# Patient Record
Sex: MALE | Race: WHITE | NOT HISPANIC OR LATINO | Employment: UNEMPLOYED | URBAN - METROPOLITAN AREA
[De-identification: names, ages, dates, MRNs, and addresses within clinical notes are randomized per-mention and may not be internally consistent; named-entity substitution may affect disease eponyms.]

---

## 2017-04-03 ENCOUNTER — HOSPITAL ENCOUNTER (OUTPATIENT)
Dept: OTHER | Facility: HOSPITAL | Age: 37
Discharge: HOME | End: 2017-04-03
Attending: FAMILY MEDICINE | Admitting: FAMILY MEDICINE

## 2017-05-26 ENCOUNTER — HOSPITAL ENCOUNTER (OUTPATIENT)
Dept: PHYSICAL THERAPY | Facility: HOSPITAL | Age: 37
Setting detail: RECURRING SERIES
Discharge: HOME | End: 2017-08-16
Attending: ORTHOPAEDIC SURGERY | Admitting: ORTHOPAEDIC SURGERY

## 2017-06-14 ENCOUNTER — HOSPITAL ENCOUNTER (OUTPATIENT)
Dept: PAIN MEDICINE | Facility: HOSPITAL | Age: 37
Discharge: HOME | End: 2017-06-14
Attending: PHYSICAL MEDICINE & REHABILITATION | Admitting: PHYSICAL MEDICINE & REHABILITATION

## 2017-07-05 ENCOUNTER — HOSPITAL ENCOUNTER (OUTPATIENT)
Dept: PAIN MEDICINE | Facility: HOSPITAL | Age: 37
Discharge: HOME | End: 2017-07-05
Attending: PHYSICAL MEDICINE & REHABILITATION | Admitting: PHYSICAL MEDICINE & REHABILITATION

## 2017-07-12 ENCOUNTER — HOSPITAL ENCOUNTER (OUTPATIENT)
Dept: ORTHOPEDIC SURGERY | Facility: CLINIC | Age: 37
Discharge: HOME | End: 2017-07-12
Attending: ORTHOPAEDIC SURGERY | Admitting: ORTHOPAEDIC SURGERY

## 2017-11-08 ENCOUNTER — HOSPITAL ENCOUNTER (OUTPATIENT)
Dept: LAB | Facility: HOSPITAL | Age: 37
Discharge: HOME | End: 2017-11-08
Attending: INTERNAL MEDICINE | Admitting: INTERNAL MEDICINE

## 2017-11-08 ENCOUNTER — OFFICE (AMBULATORY)
Dept: URBAN - METROPOLITAN AREA CLINIC 64 | Facility: CLINIC | Age: 37
End: 2017-11-08

## 2017-11-08 VITALS
DIASTOLIC BLOOD PRESSURE: 80 MMHG | HEIGHT: 71 IN | HEART RATE: 62 BPM | SYSTOLIC BLOOD PRESSURE: 125 MMHG | WEIGHT: 240 LBS

## 2017-11-08 DIAGNOSIS — R10.13 EPIGASTRIC PAIN: ICD-10-CM

## 2017-11-08 DIAGNOSIS — K21.9 GASTRO-ESOPHAGEAL REFLUX DISEASE WITHOUT ESOPHAGITIS: ICD-10-CM

## 2017-11-08 DIAGNOSIS — K59.1 FUNCTIONAL DIARRHEA: ICD-10-CM

## 2017-11-08 DIAGNOSIS — K62.5 HEMORRHAGE OF ANUS AND RECTUM: ICD-10-CM

## 2017-11-08 PROCEDURE — 99243 OFF/OP CNSLTJ NEW/EST LOW 30: CPT | Performed by: INTERNAL MEDICINE

## 2017-11-08 RX ORDER — MESALAMINE 1.2 G/1
2.4 TABLET, DELAYED RELEASE ORAL
Qty: 60 | Refills: 11 | Status: COMPLETED
Start: 2017-11-08 | End: 2018-05-03

## 2017-11-08 RX ORDER — PANTOPRAZOLE SODIUM 40 MG/1
40 TABLET, DELAYED RELEASE ORAL
Qty: 30 | Refills: 11 | Status: COMPLETED
Start: 2017-11-08 | End: 2018-12-27

## 2017-11-15 LAB
IBD PROGNOSTIC PNL SERPL IA: NORMAL
IBD SGI DIAGNOSTIC: NORMAL

## 2017-12-18 ENCOUNTER — HOSPITAL ENCOUNTER (OUTPATIENT)
Dept: MRI IMAGING | Facility: HOSPITAL | Age: 37
Discharge: HOME | End: 2017-12-18
Attending: ORTHOPAEDIC SURGERY | Admitting: ORTHOPAEDIC SURGERY

## 2018-05-03 ENCOUNTER — OFFICE (AMBULATORY)
Dept: URBAN - METROPOLITAN AREA CLINIC 64 | Facility: CLINIC | Age: 38
End: 2018-05-03

## 2018-05-03 VITALS
DIASTOLIC BLOOD PRESSURE: 82 MMHG | WEIGHT: 240 LBS | HEIGHT: 71 IN | SYSTOLIC BLOOD PRESSURE: 127 MMHG | HEART RATE: 66 BPM

## 2018-05-03 DIAGNOSIS — R35.8 OTHER POLYURIA: ICD-10-CM

## 2018-05-03 DIAGNOSIS — L97.523: ICD-10-CM

## 2018-05-03 DIAGNOSIS — R63.5 ABNORMAL WEIGHT GAIN: ICD-10-CM

## 2018-05-03 DIAGNOSIS — K51.90 ULCERATIVE COLITIS, UNSPECIFIED, WITHOUT COMPLICATIONS: ICD-10-CM

## 2018-05-03 PROCEDURE — 99213 OFFICE O/P EST LOW 20 MIN: CPT | Performed by: NURSE PRACTITIONER

## 2018-08-15 ENCOUNTER — HOSPITAL ENCOUNTER (OUTPATIENT)
Dept: ORTHOPEDIC SURGERY | Facility: CLINIC | Age: 38
Discharge: HOME | End: 2018-08-15
Attending: ORTHOPAEDIC SURGERY | Admitting: ORTHOPAEDIC SURGERY

## 2018-08-20 ENCOUNTER — HOSPITAL ENCOUNTER (OUTPATIENT)
Dept: MRI IMAGING | Facility: HOSPITAL | Age: 38
Discharge: HOME | End: 2018-08-20
Attending: ORTHOPAEDIC SURGERY | Admitting: ORTHOPAEDIC SURGERY

## 2018-08-30 ENCOUNTER — HOSPITAL ENCOUNTER (OUTPATIENT)
Dept: PREADMISSION TESTING | Facility: HOSPITAL | Age: 38
Discharge: HOME | End: 2018-08-30
Attending: ORTHOPAEDIC SURGERY | Admitting: ORTHOPAEDIC SURGERY

## 2018-08-30 LAB
ABO + RH BLD: NORMAL
ANION GAP SERPL CALC-SCNC: 14.5 MMOL/L (ref 10–20)
ARMBAND: NORMAL
BACTERIA SPEC AEROBE CULT: NORMAL
BASOPHILS # BLD AUTO: 0 10*3/UL (ref 0–0.2)
BASOPHILS NFR BLD AUTO: 1 % (ref 0–2)
BILIRUB UR QL STRIP: NEGATIVE MG/DL
BLD COMPONENT TYPE: NORMAL
BLD GP AB SCN SERPL QL: NEGATIVE
BUN SERPL-MCNC: 15 MG/DL (ref 8–20)
BUN/CREAT SERPL: 10.7 (ref 6.2–20.3)
CALCIUM SERPL-MCNC: 9.6 MG/DL (ref 8.9–10.3)
CASTS URNS QL MICRO: NORMAL /[LPF]
CHLORIDE SERPL-SCNC: 99 MMOL/L (ref 101–111)
COLOR UR: YELLOW
CONV BACTERIA IN URINE MICRO: NEGATIVE
CONV CLARITY OF URINE: CLEAR
CONV CO2: 29 MMOL/L (ref 22–32)
CONV HYALINE CASTS IN URINE MICRO: 0 /[LPF] (ref 0–5)
CONV PROTEIN IN URINE BY AUTOMATED TEST STRIP: NEGATIVE MG/DL
CONV SMALL ROUND CELLS: NORMAL /[HPF]
CONV UROBILINOGEN IN URINE BY AUTOMATED TEST STRIP: 0.2 MG/DL
CREAT UR-MCNC: 1.4 MG/DL (ref 0.7–1.2)
CROSSMATCH EXPIRATION: NORMAL
CULTURE INDICATED?: NORMAL
DIFFERENTIAL METHOD BLD: (no result)
EOSINOPHIL # BLD AUTO: 0.3 10*3/UL (ref 0–0.3)
EOSINOPHIL # BLD AUTO: 5 % (ref 0–3)
ERYTHROCYTE [DISTWIDTH] IN BLOOD BY AUTOMATED COUNT: 13.6 % (ref 11.5–14.5)
GLUCOSE SERPL-MCNC: 95 MG/DL (ref 65–99)
GLUCOSE UR QL: NEGATIVE MG/DL
HCT VFR BLD AUTO: 44.3 % (ref 40–54)
HGB BLD-MCNC: 15 G/DL (ref 14–18)
HGB UR QL STRIP: NEGATIVE
KETONES UR QL STRIP: NEGATIVE MG/DL
LEUKOCYTE ESTERASE UR QL STRIP: NEGATIVE
LYMPHOCYTES # BLD AUTO: 2.5 10*3/UL (ref 0.8–4.8)
LYMPHOCYTES NFR BLD AUTO: 42 % (ref 18–42)
Lab: NORMAL
MCH RBC QN AUTO: 31.8 PG (ref 26–32)
MCHC RBC AUTO-ENTMCNC: 33.9 G/DL (ref 32–36)
MCV RBC AUTO: 93.8 FL (ref 80–94)
MICRO REPORT STATUS: NORMAL
MONOCYTES # BLD AUTO: 0.5 10*3/UL (ref 0.1–1.3)
MONOCYTES NFR BLD AUTO: 9 % (ref 2–11)
NEUTROPHILS # BLD AUTO: 2.6 10*3/UL (ref 2.3–8.6)
NEUTROPHILS NFR BLD AUTO: 43 % (ref 50–75)
NITRITE UR QL STRIP: NEGATIVE
NRBC BLD AUTO-RTO: 0 /100{WBCS}
NRBC/RBC NFR BLD MANUAL: 0 10*3/UL
PH UR STRIP.AUTO: 6 [PH] (ref 4.5–8)
PLATELET # BLD AUTO: 290 10*3/UL (ref 150–450)
PMV BLD AUTO: 8.4 FL (ref 7.4–10.4)
POTASSIUM SERPL-SCNC: 4.5 MMOL/L (ref 3.6–5.1)
RBC # BLD AUTO: 4.72 10*6/UL (ref 4.6–6)
RBC #/AREA URNS HPF: 0 /[HPF] (ref 0–3)
SODIUM SERPL-SCNC: 138 MMOL/L (ref 136–144)
SP GR UR: 1.02 (ref 1–1.03)
SPECIMEN SOURCE: NORMAL
SPERM URNS QL MICRO: NORMAL /[HPF]
SQUAMOUS SPT QL MICRO: 0 /[HPF] (ref 0–5)
UNIDENT CRYS URNS QL MICRO: NORMAL /[HPF]
WBC # BLD AUTO: 6 10*3/UL (ref 4.5–11.5)
WBC #/AREA URNS HPF: 1 /[HPF] (ref 0–5)
YEAST SPEC QL WET PREP: NORMAL /[HPF]

## 2018-11-08 ENCOUNTER — HOSPITAL ENCOUNTER (OUTPATIENT)
Dept: PHYSICAL THERAPY | Facility: HOSPITAL | Age: 38
Setting detail: RECURRING SERIES
Discharge: HOME | End: 2018-12-31
Attending: ORTHOPAEDIC SURGERY | Admitting: ORTHOPAEDIC SURGERY

## 2018-12-05 ENCOUNTER — INPATIENT HOSPITAL (AMBULATORY)
Dept: RURAL HOSPITAL 2 | Facility: HOSPITAL | Age: 38
End: 2018-12-05

## 2018-12-05 DIAGNOSIS — M51.36 OTHER INTERVERTEBRAL DISC DEGENERATION, LUMBAR REGION: ICD-10-CM

## 2018-12-05 DIAGNOSIS — H40.9 UNSPECIFIED GLAUCOMA: ICD-10-CM

## 2018-12-05 DIAGNOSIS — K51.90 ULCERATIVE COLITIS, UNSPECIFIED, WITHOUT COMPLICATIONS: ICD-10-CM

## 2018-12-05 DIAGNOSIS — K57.20 DIVERTICULITIS OF LARGE INTESTINE WITH PERFORATION AND ABSCE: ICD-10-CM

## 2018-12-05 PROCEDURE — 99252 IP/OBS CONSLTJ NEW/EST SF 35: CPT | Performed by: INTERNAL MEDICINE

## 2018-12-12 ENCOUNTER — HOSPITAL ENCOUNTER (OUTPATIENT)
Dept: ORTHOPEDIC SURGERY | Facility: CLINIC | Age: 38
Discharge: HOME | End: 2018-12-12
Attending: ORTHOPAEDIC SURGERY | Admitting: ORTHOPAEDIC SURGERY

## 2018-12-27 ENCOUNTER — OFFICE (AMBULATORY)
Dept: URBAN - METROPOLITAN AREA CLINIC 64 | Facility: CLINIC | Age: 38
End: 2018-12-27

## 2018-12-27 VITALS
WEIGHT: 241 LBS | HEIGHT: 71 IN | HEART RATE: 76 BPM | DIASTOLIC BLOOD PRESSURE: 81 MMHG | SYSTOLIC BLOOD PRESSURE: 112 MMHG

## 2018-12-27 DIAGNOSIS — R10.9 UNSPECIFIED ABDOMINAL PAIN: ICD-10-CM

## 2018-12-27 DIAGNOSIS — R11.0 NAUSEA: ICD-10-CM

## 2018-12-27 DIAGNOSIS — K59.1 FUNCTIONAL DIARRHEA: ICD-10-CM

## 2018-12-27 PROCEDURE — 99213 OFFICE O/P EST LOW 20 MIN: CPT | Performed by: NURSE PRACTITIONER

## 2018-12-27 RX ORDER — HYOSCYAMINE SULFATE 0.12 MG/1
0.5 TABLET ORAL; SUBLINGUAL
Qty: 60 | Refills: 5 | Status: COMPLETED
Start: 2018-12-27 | End: 2019-02-01

## 2019-02-05 ENCOUNTER — HOSPITAL ENCOUNTER (OUTPATIENT)
Dept: ORTHOPEDIC SURGERY | Facility: CLINIC | Age: 39
Discharge: HOME | End: 2019-02-05
Attending: ORTHOPAEDIC SURGERY | Admitting: ORTHOPAEDIC SURGERY

## 2019-03-04 ENCOUNTER — HOSPITAL ENCOUNTER (OUTPATIENT)
Dept: PHYSICAL THERAPY | Facility: HOSPITAL | Age: 39
Setting detail: RECURRING SERIES
Discharge: HOME | End: 2019-04-17
Attending: ORTHOPAEDIC SURGERY | Admitting: ORTHOPAEDIC SURGERY

## 2019-03-26 ENCOUNTER — OFFICE (AMBULATORY)
Dept: URBAN - METROPOLITAN AREA PATHOLOGY 4 | Facility: PATHOLOGY | Age: 39
End: 2019-03-26

## 2019-03-26 ENCOUNTER — ON CAMPUS - OUTPATIENT (AMBULATORY)
Dept: URBAN - METROPOLITAN AREA HOSPITAL 2 | Facility: HOSPITAL | Age: 39
End: 2019-03-26

## 2019-03-26 VITALS
DIASTOLIC BLOOD PRESSURE: 55 MMHG | HEART RATE: 63 BPM | TEMPERATURE: 97 F | SYSTOLIC BLOOD PRESSURE: 108 MMHG | HEART RATE: 64 BPM | DIASTOLIC BLOOD PRESSURE: 67 MMHG | RESPIRATION RATE: 17 BRPM | DIASTOLIC BLOOD PRESSURE: 41 MMHG | WEIGHT: 237 LBS | SYSTOLIC BLOOD PRESSURE: 114 MMHG | HEART RATE: 45 BPM | OXYGEN SATURATION: 100 % | DIASTOLIC BLOOD PRESSURE: 54 MMHG | SYSTOLIC BLOOD PRESSURE: 103 MMHG | SYSTOLIC BLOOD PRESSURE: 142 MMHG | DIASTOLIC BLOOD PRESSURE: 82 MMHG | HEART RATE: 50 BPM | HEART RATE: 74 BPM | DIASTOLIC BLOOD PRESSURE: 88 MMHG | SYSTOLIC BLOOD PRESSURE: 93 MMHG | OXYGEN SATURATION: 97 % | SYSTOLIC BLOOD PRESSURE: 102 MMHG | DIASTOLIC BLOOD PRESSURE: 79 MMHG | HEART RATE: 57 BPM | RESPIRATION RATE: 18 BRPM | RESPIRATION RATE: 16 BRPM | HEIGHT: 71 IN | OXYGEN SATURATION: 95 % | OXYGEN SATURATION: 98 %

## 2019-03-26 DIAGNOSIS — K64.1 SECOND DEGREE HEMORRHOIDS: ICD-10-CM

## 2019-03-26 DIAGNOSIS — K52.9 NONINFECTIVE GASTROENTERITIS AND COLITIS, UNSPECIFIED: ICD-10-CM

## 2019-03-26 DIAGNOSIS — K57.30 DIVERTICULOSIS OF LARGE INTESTINE WITHOUT PERFORATION OR ABS: ICD-10-CM

## 2019-03-26 DIAGNOSIS — K57.92 DIVERTICULITIS OF INTESTINE, PART UNSPECIFIED, WITHOUT PERFO: ICD-10-CM

## 2019-03-26 LAB
GI HISTOLOGY: A. UNSPECIFIED: (no result)
GI HISTOLOGY: PDF REPORT: (no result)

## 2019-03-26 PROCEDURE — 45380 COLONOSCOPY AND BIOPSY: CPT | Performed by: INTERNAL MEDICINE

## 2019-03-26 PROCEDURE — 88305 TISSUE EXAM BY PATHOLOGIST: CPT | Performed by: INTERNAL MEDICINE

## 2019-03-28 ENCOUNTER — HOSPITAL ENCOUNTER (OUTPATIENT)
Dept: INTERVENTIONAL RADIOLOGY/VASCULAR | Facility: HOSPITAL | Age: 39
Discharge: HOME | End: 2019-03-28
Attending: ORTHOPAEDIC SURGERY | Admitting: ORTHOPAEDIC SURGERY

## 2019-04-17 ENCOUNTER — OFFICE (AMBULATORY)
Dept: URBAN - METROPOLITAN AREA CLINIC 64 | Facility: CLINIC | Age: 39
End: 2019-04-17

## 2019-04-17 VITALS
DIASTOLIC BLOOD PRESSURE: 70 MMHG | SYSTOLIC BLOOD PRESSURE: 105 MMHG | HEART RATE: 70 BPM | WEIGHT: 240 LBS | HEIGHT: 71 IN

## 2019-04-17 DIAGNOSIS — K57.92 DIVERTICULITIS OF INTESTINE, PART UNSPECIFIED, WITHOUT PERFO: ICD-10-CM

## 2019-04-17 PROCEDURE — 99214 OFFICE O/P EST MOD 30 MIN: CPT | Performed by: INTERNAL MEDICINE

## 2019-04-17 RX ORDER — AMOXICILLIN AND CLAVULANATE POTASSIUM 875; 125 MG/1; MG/1
1750 TABLET, FILM COATED ORAL
Qty: 20 | Refills: 1 | Status: COMPLETED
Start: 2019-04-17 | End: 2020-08-13

## 2019-06-28 ENCOUNTER — APPOINTMENT (OUTPATIENT)
Dept: PREADMISSION TESTING | Facility: HOSPITAL | Age: 39
End: 2019-06-28

## 2019-06-28 VITALS
DIASTOLIC BLOOD PRESSURE: 66 MMHG | WEIGHT: 245.25 LBS | SYSTOLIC BLOOD PRESSURE: 109 MMHG | HEART RATE: 64 BPM | BODY MASS INDEX: 34.33 KG/M2 | HEIGHT: 71 IN | OXYGEN SATURATION: 97 %

## 2019-06-28 LAB
ABO GROUP BLD: NORMAL
ANION GAP SERPL CALCULATED.3IONS-SCNC: 14.1 MMOL/L (ref 10–20)
APTT PPP: 25.7 SECONDS (ref 24–31)
BASOPHILS # BLD AUTO: 0 10*3/MM3 (ref 0–0.2)
BASOPHILS NFR BLD AUTO: 0.7 % (ref 0–1.5)
BLD GP AB SCN SERPL QL: NEGATIVE
BUN BLD-MCNC: 12 MG/DL (ref 8–20)
BUN/CREAT SERPL: 9.2 (ref 6.2–20.3)
CALCIUM SPEC-SCNC: 8.7 MG/DL (ref 8.9–10.3)
CHLORIDE SERPL-SCNC: 104 MMOL/L (ref 101–111)
CO2 SERPL-SCNC: 25 MMOL/L (ref 22–32)
CREAT BLD-MCNC: 1.3 MG/DL (ref 0.7–1.2)
DEPRECATED RDW RBC AUTO: 45.9 FL (ref 37–54)
EOSINOPHIL # BLD AUTO: 0.2 10*3/MM3 (ref 0–0.4)
EOSINOPHIL NFR BLD AUTO: 3.3 % (ref 0.3–6.2)
ERYTHROCYTE [DISTWIDTH] IN BLOOD BY AUTOMATED COUNT: 14.1 % (ref 12.3–15.4)
GFR SERPL CREATININE-BSD FRML MDRD: 61 ML/MIN/1.73
GLUCOSE BLD-MCNC: 102 MG/DL (ref 65–99)
HCT VFR BLD AUTO: 43 % (ref 37.5–51)
HGB BLD-MCNC: 14.6 G/DL (ref 13–17.7)
INR PPP: 0.94 (ref 0.9–1.1)
LYMPHOCYTES # BLD AUTO: 2.5 10*3/MM3 (ref 0.7–3.1)
LYMPHOCYTES NFR BLD AUTO: 38 % (ref 19.6–45.3)
MCH RBC QN AUTO: 32 PG (ref 26.6–33)
MCHC RBC AUTO-ENTMCNC: 33.9 G/DL (ref 31.5–35.7)
MCV RBC AUTO: 94.4 FL (ref 79–97)
MONOCYTES # BLD AUTO: 0.5 10*3/MM3 (ref 0.1–0.9)
MONOCYTES NFR BLD AUTO: 8 % (ref 5–12)
NEUTROPHILS # BLD AUTO: 3.3 10*3/MM3 (ref 1.7–7)
NEUTROPHILS NFR BLD AUTO: 50 % (ref 42.7–76)
NRBC BLD AUTO-RTO: 0.2 /100 WBC (ref 0–0.2)
PLATELET # BLD AUTO: 262 10*3/MM3 (ref 140–450)
PMV BLD AUTO: 8.3 FL (ref 6–12)
POTASSIUM BLD-SCNC: 4.1 MMOL/L (ref 3.6–5.1)
PROTHROMBIN TIME: 9.8 SECONDS (ref 9.6–11.7)
RBC # BLD AUTO: 4.55 10*6/MM3 (ref 4.14–5.8)
RH BLD: POSITIVE
SODIUM BLD-SCNC: 139 MMOL/L (ref 136–144)
T&S EXPIRATION DATE: NORMAL
WBC NRBC COR # BLD: 6.6 10*3/MM3 (ref 3.4–10.8)

## 2019-06-28 PROCEDURE — 80048 BASIC METABOLIC PNL TOTAL CA: CPT | Performed by: SURGERY

## 2019-06-28 PROCEDURE — 85730 THROMBOPLASTIN TIME PARTIAL: CPT | Performed by: SURGERY

## 2019-06-28 PROCEDURE — 36415 COLL VENOUS BLD VENIPUNCTURE: CPT

## 2019-06-28 PROCEDURE — 86901 BLOOD TYPING SEROLOGIC RH(D): CPT

## 2019-06-28 PROCEDURE — 86900 BLOOD TYPING SEROLOGIC ABO: CPT | Performed by: SURGERY

## 2019-06-28 PROCEDURE — 86901 BLOOD TYPING SEROLOGIC RH(D): CPT | Performed by: SURGERY

## 2019-06-28 PROCEDURE — 85610 PROTHROMBIN TIME: CPT | Performed by: SURGERY

## 2019-06-28 PROCEDURE — 86850 RBC ANTIBODY SCREEN: CPT | Performed by: SURGERY

## 2019-06-28 PROCEDURE — 86900 BLOOD TYPING SEROLOGIC ABO: CPT

## 2019-06-28 PROCEDURE — 85025 COMPLETE CBC W/AUTO DIFF WBC: CPT | Performed by: SURGERY

## 2019-07-08 ENCOUNTER — HOSPITAL ENCOUNTER (INPATIENT)
Facility: HOSPITAL | Age: 39
LOS: 3 days | Discharge: HOME | End: 2019-07-11
Attending: SURGERY | Admitting: SURGERY

## 2019-07-08 ENCOUNTER — ANESTHESIA (OUTPATIENT)
Dept: PERIOP | Facility: HOSPITAL | Age: 39
End: 2019-07-08

## 2019-07-08 ENCOUNTER — ANESTHESIA EVENT (OUTPATIENT)
Dept: PERIOP | Facility: HOSPITAL | Age: 39
End: 2019-07-08

## 2019-07-08 DIAGNOSIS — K57.32 SIGMOID DIVERTICULITIS: Primary | ICD-10-CM

## 2019-07-08 DIAGNOSIS — K57.92 DIVERTICULITIS: ICD-10-CM

## 2019-07-08 PROBLEM — Z90.49 S/P COLON RESECTION: Status: ACTIVE | Noted: 2019-07-08

## 2019-07-08 PROCEDURE — 25010000003 CEFAZOLIN PER 500 MG: Performed by: SURGERY

## 2019-07-08 PROCEDURE — 93010 ELECTROCARDIOGRAM REPORT: CPT | Performed by: INTERNAL MEDICINE

## 2019-07-08 PROCEDURE — 25010000002 DEXAMETHASONE PER 1 MG: Performed by: ANESTHESIOLOGY

## 2019-07-08 PROCEDURE — 25010000002 DEXAMETHASONE PER 1 MG: Performed by: ANESTHESIOLOGIST ASSISTANT

## 2019-07-08 PROCEDURE — 25010000002 PROPOFOL 200 MG/20ML EMULSION: Performed by: ANESTHESIOLOGIST ASSISTANT

## 2019-07-08 PROCEDURE — 88307 TISSUE EXAM BY PATHOLOGIST: CPT | Performed by: SURGERY

## 2019-07-08 PROCEDURE — 25010000002 HYDROMORPHONE PER 4 MG: Performed by: SURGERY

## 2019-07-08 PROCEDURE — 25010000002 ONDANSETRON PER 1 MG: Performed by: ANESTHESIOLOGIST ASSISTANT

## 2019-07-08 PROCEDURE — 0DTN4ZZ RESECTION OF SIGMOID COLON, PERCUTANEOUS ENDOSCOPIC APPROACH: ICD-10-PCS | Performed by: SURGERY

## 2019-07-08 PROCEDURE — 93005 ELECTROCARDIOGRAM TRACING: CPT | Performed by: SURGERY

## 2019-07-08 PROCEDURE — 25010000002 FENTANYL CITRATE (PF) 250 MCG/5ML SOLUTION: Performed by: ANESTHESIOLOGIST ASSISTANT

## 2019-07-08 PROCEDURE — 25010000002 HYDROMORPHONE PER 4 MG: Performed by: ANESTHESIOLOGIST ASSISTANT

## 2019-07-08 DEVICE — ENDOSCOPIC LINEAR CUTTER RELOADS BLUE 3.5MM
Type: IMPLANTABLE DEVICE | Site: SIGMOID COLON | Status: FUNCTIONAL
Brand: ECHELON; ENDOPATH

## 2019-07-08 RX ORDER — DEXAMETHASONE SODIUM PHOSPHATE 4 MG/ML
INJECTION, SOLUTION INTRA-ARTICULAR; INTRALESIONAL; INTRAMUSCULAR; INTRAVENOUS; SOFT TISSUE AS NEEDED
Status: DISCONTINUED | OUTPATIENT
Start: 2019-07-08 | End: 2019-07-08 | Stop reason: SURG

## 2019-07-08 RX ORDER — SODIUM CHLORIDE 0.9 % (FLUSH) 0.9 %
3-10 SYRINGE (ML) INJECTION AS NEEDED
Status: DISCONTINUED | OUTPATIENT
Start: 2019-07-08 | End: 2019-07-11 | Stop reason: HOSPADM

## 2019-07-08 RX ORDER — GLYCOPYRROLATE 0.2 MG/ML
INJECTION INTRAMUSCULAR; INTRAVENOUS AS NEEDED
Status: DISCONTINUED | OUTPATIENT
Start: 2019-07-08 | End: 2019-07-08 | Stop reason: SURG

## 2019-07-08 RX ORDER — DIPHENHYDRAMINE HYDROCHLORIDE 50 MG/ML
12.5 INJECTION INTRAMUSCULAR; INTRAVENOUS
Status: DISCONTINUED | OUTPATIENT
Start: 2019-07-08 | End: 2019-07-08 | Stop reason: HOSPADM

## 2019-07-08 RX ORDER — FENTANYL CITRATE 50 UG/ML
25 INJECTION, SOLUTION INTRAMUSCULAR; INTRAVENOUS
Status: DISCONTINUED | OUTPATIENT
Start: 2019-07-08 | End: 2019-07-08 | Stop reason: HOSPADM

## 2019-07-08 RX ORDER — HYDROMORPHONE HCL 110MG/55ML
1 PATIENT CONTROLLED ANALGESIA SYRINGE INTRAVENOUS
Status: DISCONTINUED | OUTPATIENT
Start: 2019-07-08 | End: 2019-07-11 | Stop reason: HOSPADM

## 2019-07-08 RX ORDER — NALOXONE HCL 0.4 MG/ML
0.1 VIAL (ML) INJECTION
Status: DISCONTINUED | OUTPATIENT
Start: 2019-07-08 | End: 2019-07-11 | Stop reason: HOSPADM

## 2019-07-08 RX ORDER — DEXAMETHASONE SODIUM PHOSPHATE 4 MG/ML
INJECTION, SOLUTION INTRA-ARTICULAR; INTRALESIONAL; INTRAMUSCULAR; INTRAVENOUS; SOFT TISSUE
Status: COMPLETED | OUTPATIENT
Start: 2019-07-08 | End: 2019-07-08

## 2019-07-08 RX ORDER — BISACODYL 5 MG/1
10 TABLET, DELAYED RELEASE ORAL DAILY PRN
Status: DISCONTINUED | OUTPATIENT
Start: 2019-07-08 | End: 2019-07-11 | Stop reason: HOSPADM

## 2019-07-08 RX ORDER — ONDANSETRON 4 MG/1
4 TABLET, FILM COATED ORAL EVERY 6 HOURS PRN
Status: DISCONTINUED | OUTPATIENT
Start: 2019-07-08 | End: 2019-07-11 | Stop reason: HOSPADM

## 2019-07-08 RX ORDER — PROPOFOL 10 MG/ML
INJECTION, EMULSION INTRAVENOUS AS NEEDED
Status: DISCONTINUED | OUTPATIENT
Start: 2019-07-08 | End: 2019-07-08 | Stop reason: SURG

## 2019-07-08 RX ORDER — FAMOTIDINE 20 MG/1
20 TABLET, FILM COATED ORAL 2 TIMES DAILY
Status: DISCONTINUED | OUTPATIENT
Start: 2019-07-08 | End: 2019-07-11 | Stop reason: HOSPADM

## 2019-07-08 RX ORDER — EPHEDRINE SULFATE 50 MG/ML
5 INJECTION, SOLUTION INTRAVENOUS ONCE AS NEEDED
Status: DISCONTINUED | OUTPATIENT
Start: 2019-07-08 | End: 2019-07-08 | Stop reason: HOSPADM

## 2019-07-08 RX ORDER — DOCUSATE SODIUM 100 MG/1
100 CAPSULE, LIQUID FILLED ORAL 2 TIMES DAILY PRN
Status: DISCONTINUED | OUTPATIENT
Start: 2019-07-08 | End: 2019-07-11 | Stop reason: HOSPADM

## 2019-07-08 RX ORDER — SODIUM CHLORIDE 0.9 % (FLUSH) 0.9 %
3-10 SYRINGE (ML) INJECTION AS NEEDED
Status: DISCONTINUED | OUTPATIENT
Start: 2019-07-08 | End: 2019-07-08 | Stop reason: HOSPADM

## 2019-07-08 RX ORDER — FLUMAZENIL 0.1 MG/ML
0.2 INJECTION INTRAVENOUS AS NEEDED
Status: DISCONTINUED | OUTPATIENT
Start: 2019-07-08 | End: 2019-07-08 | Stop reason: HOSPADM

## 2019-07-08 RX ORDER — LABETALOL HYDROCHLORIDE 5 MG/ML
5 INJECTION, SOLUTION INTRAVENOUS
Status: DISCONTINUED | OUTPATIENT
Start: 2019-07-08 | End: 2019-07-08 | Stop reason: HOSPADM

## 2019-07-08 RX ORDER — LIDOCAINE HYDROCHLORIDE 10 MG/ML
INJECTION, SOLUTION EPIDURAL; INFILTRATION; INTRACAUDAL; PERINEURAL AS NEEDED
Status: DISCONTINUED | OUTPATIENT
Start: 2019-07-08 | End: 2019-07-08 | Stop reason: SURG

## 2019-07-08 RX ORDER — PHENYLEPHRINE HCL IN 0.9% NACL 0.5 MG/5ML
.5-3 SYRINGE (ML) INTRAVENOUS
Status: DISCONTINUED | OUTPATIENT
Start: 2019-07-08 | End: 2019-07-08 | Stop reason: HOSPADM

## 2019-07-08 RX ORDER — HYDROMORPHONE HCL 110MG/55ML
0.25 PATIENT CONTROLLED ANALGESIA SYRINGE INTRAVENOUS
Status: DISCONTINUED | OUTPATIENT
Start: 2019-07-08 | End: 2019-07-08 | Stop reason: HOSPADM

## 2019-07-08 RX ORDER — SODIUM CHLORIDE 0.9 % (FLUSH) 0.9 %
3 SYRINGE (ML) INJECTION EVERY 12 HOURS SCHEDULED
Status: DISCONTINUED | OUTPATIENT
Start: 2019-07-08 | End: 2019-07-11 | Stop reason: HOSPADM

## 2019-07-08 RX ORDER — SODIUM CHLORIDE 9 MG/ML
9 INJECTION, SOLUTION INTRAVENOUS CONTINUOUS PRN
Status: DISCONTINUED | OUTPATIENT
Start: 2019-07-08 | End: 2019-07-08 | Stop reason: HOSPADM

## 2019-07-08 RX ORDER — ONDANSETRON 2 MG/ML
4 INJECTION INTRAMUSCULAR; INTRAVENOUS EVERY 6 HOURS PRN
Status: DISCONTINUED | OUTPATIENT
Start: 2019-07-08 | End: 2019-07-11 | Stop reason: HOSPADM

## 2019-07-08 RX ORDER — LABETALOL 20 MG/4 ML (5 MG/ML) INTRAVENOUS SYRINGE
AS NEEDED
Status: DISCONTINUED | OUTPATIENT
Start: 2019-07-08 | End: 2019-07-08 | Stop reason: SURG

## 2019-07-08 RX ORDER — SODIUM CHLORIDE 0.9 % (FLUSH) 0.9 %
5 SYRINGE (ML) INJECTION AS NEEDED
Status: DISCONTINUED | OUTPATIENT
Start: 2019-07-08 | End: 2019-07-08

## 2019-07-08 RX ORDER — LIDOCAINE HYDROCHLORIDE 10 MG/ML
0.5 INJECTION, SOLUTION EPIDURAL; INFILTRATION; INTRACAUDAL; PERINEURAL ONCE AS NEEDED
Status: DISCONTINUED | OUTPATIENT
Start: 2019-07-08 | End: 2019-07-08 | Stop reason: HOSPADM

## 2019-07-08 RX ORDER — SODIUM CHLORIDE 9 MG/ML
9 INJECTION, SOLUTION INTRAVENOUS CONTINUOUS
Status: DISCONTINUED | OUTPATIENT
Start: 2019-07-08 | End: 2019-07-08

## 2019-07-08 RX ORDER — SODIUM CHLORIDE, SODIUM LACTATE, POTASSIUM CHLORIDE, CALCIUM CHLORIDE 600; 310; 30; 20 MG/100ML; MG/100ML; MG/100ML; MG/100ML
INJECTION, SOLUTION INTRAVENOUS CONTINUOUS PRN
Status: DISCONTINUED | OUTPATIENT
Start: 2019-07-08 | End: 2019-07-08 | Stop reason: SURG

## 2019-07-08 RX ORDER — SODIUM CHLORIDE, SODIUM LACTATE, POTASSIUM CHLORIDE, CALCIUM CHLORIDE 600; 310; 30; 20 MG/100ML; MG/100ML; MG/100ML; MG/100ML
INJECTION, SOLUTION INTRAVENOUS CONTINUOUS PRN
Status: DISCONTINUED | OUTPATIENT
Start: 2019-07-08 | End: 2019-07-08

## 2019-07-08 RX ORDER — FENTANYL CITRATE 50 UG/ML
INJECTION, SOLUTION INTRAMUSCULAR; INTRAVENOUS AS NEEDED
Status: DISCONTINUED | OUTPATIENT
Start: 2019-07-08 | End: 2019-07-08 | Stop reason: SURG

## 2019-07-08 RX ORDER — NALOXONE HCL 0.4 MG/ML
0.4 VIAL (ML) INJECTION AS NEEDED
Status: DISCONTINUED | OUTPATIENT
Start: 2019-07-08 | End: 2019-07-08 | Stop reason: HOSPADM

## 2019-07-08 RX ORDER — IPRATROPIUM BROMIDE AND ALBUTEROL SULFATE 2.5; .5 MG/3ML; MG/3ML
3 SOLUTION RESPIRATORY (INHALATION) ONCE AS NEEDED
Status: DISCONTINUED | OUTPATIENT
Start: 2019-07-08 | End: 2019-07-08 | Stop reason: HOSPADM

## 2019-07-08 RX ORDER — SODIUM CHLORIDE 0.9 % (FLUSH) 0.9 %
3 SYRINGE (ML) INJECTION EVERY 12 HOURS SCHEDULED
Status: DISCONTINUED | OUTPATIENT
Start: 2019-07-08 | End: 2019-07-08

## 2019-07-08 RX ORDER — PROMETHAZINE HYDROCHLORIDE 25 MG/1
25 SUPPOSITORY RECTAL ONCE AS NEEDED
Status: DISCONTINUED | OUTPATIENT
Start: 2019-07-08 | End: 2019-07-08 | Stop reason: HOSPADM

## 2019-07-08 RX ORDER — PROMETHAZINE HYDROCHLORIDE 25 MG/ML
6.25 INJECTION, SOLUTION INTRAMUSCULAR; INTRAVENOUS ONCE AS NEEDED
Status: DISCONTINUED | OUTPATIENT
Start: 2019-07-08 | End: 2019-07-08 | Stop reason: HOSPADM

## 2019-07-08 RX ORDER — HYDROMORPHONE HCL 110MG/55ML
PATIENT CONTROLLED ANALGESIA SYRINGE INTRAVENOUS AS NEEDED
Status: DISCONTINUED | OUTPATIENT
Start: 2019-07-08 | End: 2019-07-08 | Stop reason: SURG

## 2019-07-08 RX ORDER — PROMETHAZINE HYDROCHLORIDE 25 MG/ML
12.5 INJECTION, SOLUTION INTRAMUSCULAR; INTRAVENOUS EVERY 6 HOURS PRN
Status: DISCONTINUED | OUTPATIENT
Start: 2019-07-08 | End: 2019-07-11 | Stop reason: HOSPADM

## 2019-07-08 RX ORDER — HYDROMORPHONE HYDROCHLORIDE 2 MG/1
2 TABLET ORAL
Status: DISCONTINUED | OUTPATIENT
Start: 2019-07-08 | End: 2019-07-11 | Stop reason: HOSPADM

## 2019-07-08 RX ORDER — ONDANSETRON 2 MG/ML
4 INJECTION INTRAMUSCULAR; INTRAVENOUS ONCE AS NEEDED
Status: DISCONTINUED | OUTPATIENT
Start: 2019-07-08 | End: 2019-07-08 | Stop reason: HOSPADM

## 2019-07-08 RX ORDER — ESMOLOL HYDROCHLORIDE 10 MG/ML
INJECTION INTRAVENOUS AS NEEDED
Status: DISCONTINUED | OUTPATIENT
Start: 2019-07-08 | End: 2019-07-08 | Stop reason: SURG

## 2019-07-08 RX ORDER — BUPIVACAINE HYDROCHLORIDE 2.5 MG/ML
INJECTION, SOLUTION EPIDURAL; INFILTRATION; INTRACAUDAL
Status: COMPLETED | OUTPATIENT
Start: 2019-07-08 | End: 2019-07-08

## 2019-07-08 RX ORDER — NEOSTIGMINE METHYLSULFATE 5 MG/5 ML
SYRINGE (ML) INTRAVENOUS AS NEEDED
Status: DISCONTINUED | OUTPATIENT
Start: 2019-07-08 | End: 2019-07-08 | Stop reason: SURG

## 2019-07-08 RX ORDER — ROCURONIUM BROMIDE 10 MG/ML
INJECTION, SOLUTION INTRAVENOUS AS NEEDED
Status: DISCONTINUED | OUTPATIENT
Start: 2019-07-08 | End: 2019-07-08 | Stop reason: SURG

## 2019-07-08 RX ORDER — SODIUM CHLORIDE 9 MG/ML
INJECTION, SOLUTION INTRAVENOUS CONTINUOUS PRN
Status: DISCONTINUED | OUTPATIENT
Start: 2019-07-08 | End: 2019-07-08 | Stop reason: SURG

## 2019-07-08 RX ORDER — MIDAZOLAM HYDROCHLORIDE 1 MG/ML
1 INJECTION INTRAMUSCULAR; INTRAVENOUS
Status: DISCONTINUED | OUTPATIENT
Start: 2019-07-08 | End: 2019-07-08 | Stop reason: HOSPADM

## 2019-07-08 RX ORDER — PROMETHAZINE HYDROCHLORIDE 25 MG/1
25 TABLET ORAL ONCE AS NEEDED
Status: DISCONTINUED | OUTPATIENT
Start: 2019-07-08 | End: 2019-07-08 | Stop reason: HOSPADM

## 2019-07-08 RX ORDER — DEXTROSE AND SODIUM CHLORIDE 5; .45 G/100ML; G/100ML
125 INJECTION, SOLUTION INTRAVENOUS CONTINUOUS
Status: DISCONTINUED | OUTPATIENT
Start: 2019-07-08 | End: 2019-07-11 | Stop reason: HOSPADM

## 2019-07-08 RX ORDER — HYDRALAZINE HYDROCHLORIDE 20 MG/ML
5 INJECTION INTRAMUSCULAR; INTRAVENOUS
Status: DISCONTINUED | OUTPATIENT
Start: 2019-07-08 | End: 2019-07-08 | Stop reason: HOSPADM

## 2019-07-08 RX ORDER — ONDANSETRON 2 MG/ML
INJECTION INTRAMUSCULAR; INTRAVENOUS AS NEEDED
Status: DISCONTINUED | OUTPATIENT
Start: 2019-07-08 | End: 2019-07-08 | Stop reason: SURG

## 2019-07-08 RX ORDER — SODIUM CHLORIDE 0.9 % (FLUSH) 0.9 %
3 SYRINGE (ML) INJECTION EVERY 12 HOURS SCHEDULED
Status: DISCONTINUED | OUTPATIENT
Start: 2019-07-08 | End: 2019-07-08 | Stop reason: HOSPADM

## 2019-07-08 RX ADMIN — HYDROMORPHONE HYDROCHLORIDE 1 MG: 2 INJECTION, SOLUTION INTRAMUSCULAR; INTRAVENOUS; SUBCUTANEOUS at 23:17

## 2019-07-08 RX ADMIN — ESMOLOL HYDROCHLORIDE 20 MG: 10 INJECTION, SOLUTION INTRAVENOUS at 12:47

## 2019-07-08 RX ADMIN — HYDROMORPHONE HYDROCHLORIDE 0.25 MG: 2 INJECTION INTRAMUSCULAR; INTRAVENOUS; SUBCUTANEOUS at 15:21

## 2019-07-08 RX ADMIN — HYDROMORPHONE HYDROCHLORIDE 1 MG: 2 INJECTION, SOLUTION INTRAMUSCULAR; INTRAVENOUS; SUBCUTANEOUS at 20:29

## 2019-07-08 RX ADMIN — GLYCOPYRROLATE 0.6 MG: 0.2 INJECTION, SOLUTION INTRAMUSCULAR; INTRAVENOUS at 14:32

## 2019-07-08 RX ADMIN — FAMOTIDINE 20 MG: 20 TABLET, FILM COATED ORAL at 20:29

## 2019-07-08 RX ADMIN — CEFAZOLIN SODIUM 2 G: 1 INJECTION, POWDER, FOR SOLUTION INTRAMUSCULAR; INTRAVENOUS at 12:26

## 2019-07-08 RX ADMIN — HYDROMORPHONE HYDROCHLORIDE 1 MG: 2 INJECTION INTRAMUSCULAR; INTRAVENOUS; SUBCUTANEOUS at 13:12

## 2019-07-08 RX ADMIN — CEFAZOLIN SODIUM 1 G: 1 INJECTION, POWDER, FOR SOLUTION INTRAMUSCULAR; INTRAVENOUS at 20:28

## 2019-07-08 RX ADMIN — BUPIVACAINE HYDROCHLORIDE 5 ML: 2.5 INJECTION, SOLUTION EPIDURAL; INFILTRATION; INTRACAUDAL; PERINEURAL at 14:37

## 2019-07-08 RX ADMIN — SODIUM CHLORIDE 9 ML/HR: 900 INJECTION, SOLUTION INTRAVENOUS at 10:47

## 2019-07-08 RX ADMIN — SODIUM CHLORIDE: 0.9 INJECTION, SOLUTION INTRAVENOUS at 12:07

## 2019-07-08 RX ADMIN — DEXAMETHASONE SODIUM PHOSPHATE 4 MG: 4 INJECTION, SOLUTION INTRAMUSCULAR; INTRAVENOUS at 12:38

## 2019-07-08 RX ADMIN — GLYCOPYRROLATE 0.4 MG: 0.2 INJECTION, SOLUTION INTRAMUSCULAR; INTRAVENOUS at 12:20

## 2019-07-08 RX ADMIN — Medication 4 MG: at 14:32

## 2019-07-08 RX ADMIN — FENTANYL CITRATE 50 MCG: 50 INJECTION, SOLUTION INTRAMUSCULAR; INTRAVENOUS at 12:42

## 2019-07-08 RX ADMIN — FENTANYL CITRATE 100 MCG: 50 INJECTION, SOLUTION INTRAMUSCULAR; INTRAVENOUS at 12:37

## 2019-07-08 RX ADMIN — ROCURONIUM BROMIDE 50 MG: 10 INJECTION, SOLUTION INTRAVENOUS at 12:19

## 2019-07-08 RX ADMIN — DEXAMETHASONE SODIUM PHOSPHATE 4 MG: 4 INJECTION, SOLUTION INTRAMUSCULAR; INTRAVENOUS at 14:37

## 2019-07-08 RX ADMIN — HYDROMORPHONE HYDROCHLORIDE 0.25 MG: 2 INJECTION INTRAMUSCULAR; INTRAVENOUS; SUBCUTANEOUS at 14:47

## 2019-07-08 RX ADMIN — LIDOCAINE HYDROCHLORIDE 50 MG: 10 INJECTION, SOLUTION EPIDURAL; INFILTRATION; INTRACAUDAL; PERINEURAL at 12:19

## 2019-07-08 RX ADMIN — HYDROMORPHONE HYDROCHLORIDE 0.25 MG: 2 INJECTION INTRAMUSCULAR; INTRAVENOUS; SUBCUTANEOUS at 14:52

## 2019-07-08 RX ADMIN — DEXTROSE MONOHYDRATE AND SODIUM CHLORIDE 125 ML/HR: 5; .45 INJECTION, SOLUTION INTRAVENOUS at 17:45

## 2019-07-08 RX ADMIN — METRONIDAZOLE 500 MG: 500 INJECTION, SOLUTION INTRAVENOUS at 12:31

## 2019-07-08 RX ADMIN — LABETALOL 20 MG/4 ML (5 MG/ML) INTRAVENOUS SYRINGE 5 MG: at 14:21

## 2019-07-08 RX ADMIN — HYDROMORPHONE HYDROCHLORIDE 1 MG: 2 INJECTION INTRAMUSCULAR; INTRAVENOUS; SUBCUTANEOUS at 13:02

## 2019-07-08 RX ADMIN — ONDANSETRON 4 MG: 2 INJECTION INTRAMUSCULAR; INTRAVENOUS at 14:10

## 2019-07-08 RX ADMIN — LABETALOL 20 MG/4 ML (5 MG/ML) INTRAVENOUS SYRINGE 5 MG: at 13:21

## 2019-07-08 RX ADMIN — ESMOLOL HYDROCHLORIDE 10 MG: 10 INJECTION, SOLUTION INTRAVENOUS at 12:56

## 2019-07-08 RX ADMIN — PROPOFOL 200 MG: 10 INJECTION, EMULSION INTRAVENOUS at 12:19

## 2019-07-08 RX ADMIN — ROCURONIUM BROMIDE 10 MG: 10 INJECTION, SOLUTION INTRAVENOUS at 14:03

## 2019-07-08 RX ADMIN — Medication 3 ML: at 20:36

## 2019-07-08 RX ADMIN — HYDROMORPHONE HYDROCHLORIDE 0.25 MG: 2 INJECTION INTRAMUSCULAR; INTRAVENOUS; SUBCUTANEOUS at 15:12

## 2019-07-08 RX ADMIN — FENTANYL CITRATE 50 MCG: 50 INJECTION, SOLUTION INTRAMUSCULAR; INTRAVENOUS at 12:47

## 2019-07-08 RX ADMIN — ROCURONIUM BROMIDE 10 MG: 10 INJECTION, SOLUTION INTRAVENOUS at 12:44

## 2019-07-08 RX ADMIN — ROCURONIUM BROMIDE 10 MG: 10 INJECTION, SOLUTION INTRAVENOUS at 13:35

## 2019-07-08 RX ADMIN — HYDROMORPHONE HYDROCHLORIDE 0.25 MG: 2 INJECTION INTRAMUSCULAR; INTRAVENOUS; SUBCUTANEOUS at 15:29

## 2019-07-08 RX ADMIN — HYDROMORPHONE HYDROCHLORIDE 1 MG: 2 INJECTION, SOLUTION INTRAMUSCULAR; INTRAVENOUS; SUBCUTANEOUS at 17:44

## 2019-07-08 RX ADMIN — HYDROMORPHONE HYDROCHLORIDE 0.25 MG: 2 INJECTION INTRAMUSCULAR; INTRAVENOUS; SUBCUTANEOUS at 15:01

## 2019-07-08 RX ADMIN — ROCURONIUM BROMIDE 10 MG: 10 INJECTION, SOLUTION INTRAVENOUS at 13:14

## 2019-07-08 RX ADMIN — SODIUM CHLORIDE, SODIUM LACTATE, POTASSIUM CHLORIDE, AND CALCIUM CHLORIDE: .6; .31; .03; .02 INJECTION, SOLUTION INTRAVENOUS at 13:12

## 2019-07-08 NOTE — ANESTHESIA PROCEDURE NOTES
Airway  Urgency: elective    Date/Time: 7/8/2019 12:22 PM  Airway not difficult    General Information and Staff    Patient location during procedure: OR  Anesthesiologist: Genoveva Frank MD  CRNA: Mauricio Campos AA    Indications and Patient Condition  Indications for airway management: airway protection    Preoxygenated: yes  MILS maintained throughout  Mask difficulty assessment: 1 - vent by mask    Final Airway Details  Final airway type: endotracheal airway      Successful airway: ETT  Cuffed: yes   Successful intubation technique: direct laryngoscopy  Endotracheal tube insertion site: oral  Blade: Paddy  Blade size: 4  ETT size (mm): 7.5  Cormack-Lehane Classification: grade I - full view of glottis  Placement verified by: chest auscultation and capnometry   Cuff volume (mL): 10  Measured from: lips  ETT to lips (cm): 23  Number of attempts at approach: 1    Additional Comments  Atraumatic intubation

## 2019-07-08 NOTE — ANESTHESIA PROCEDURE NOTES
Peripheral Block    Pre-sedation assessment completed: 7/8/2019 2:35 PM    Patient reassessed immediately prior to procedure    Patient location during procedure: OR  Start time: 7/8/2019 2:30 PM  Stop time: 7/8/2019 2:35 PM  Performed by  Anesthesiologist: Genoveva Frank MD  Preanesthetic Checklist  Completed: patient identified, site marked, surgical consent, pre-op evaluation, timeout performed, IV checked, risks and benefits discussed and monitors and equipment checked  Prep:  Pt Position: supine  Sterile barriers:cap, gloves, gown, mask and sterile barriers  Prep: ChloraPrep  Patient monitoring: blood pressure monitoring, continuous pulse oximetry and EKG  Procedure  Performed under: general  Guidance:ultrasound guided  ULTRASOUND INTERPRETATION. Using ultrasound guidance a 20 G gauge needle was placed in close proximity to the nerve, at which point, under ultrasound guidance anesthetic was injected in the area of the nerve and spread of the anesthesia was seen on ultrasound in close proximity thereto.  There were no abnormalities seen on ultrasound; a digital image was taken; and the patient tolerated the procedure with no complications. Images:still images obtained, printed/placed on chart    Laterality:Bilateral  Block Type:TAP  Injection Technique:single-shot  Needle Type:echogenic  Needle Gauge:20 G  Resistance on Injection: none    Medications Used: dexamethasone (DECADRON) injection, 4 mg  bupivacaine PF (MARCAINE) 0.25 % injection, 5 mL  Med admintered at 7/8/2019 2:37 PM      Medications  Preservative Free Saline:5ml  Comment:0.25 PF ropi 30cc injected bilateral with 4mg decadron   Ultrasound guidance   No resistance  vss   chidi block well; done under general with US visualization; 5cc NS used to hydrodissect prior to local     Bowel visualized deep to needle placement and distal from needle    Post Assessment  Injection Assessment: negative aspiration for heme, no paresthesia on injection and  incremental injection  Patient Tolerance:comfortable throughout block  Complications:no

## 2019-07-08 NOTE — ANESTHESIA POSTPROCEDURE EVALUATION
Patient: Armaan Smith    Procedure Summary     Date:  07/08/19 Room / Location:  [facility] OR 08 / [facility] MAIN OR    Anesthesia Start:  1207 Anesthesia Stop:  1444    Procedure:  LAPAROSCOPIC SIGMOID COLECTOMY (N/A Abdomen) Diagnosis:  (RECURRENT DIVERTICULITIS)    Surgeon:  Idania Umanzor MD Provider:  Genoveva Frank MD    Anesthesia Type:  general with block ASA Status:  3          Anesthesia Type: general with block  Last vitals  BP   143/77 (07/08/19 1540)   Temp   97.1 °F (36.2 °C) (07/08/19 1525)   Pulse   (!) 44 (07/08/19 1540)   Resp   13 (07/08/19 1540)     SpO2   92 % (07/08/19 1540)     Post Anesthesia Care and Evaluation    Patient location during evaluation: PACU  Patient participation: complete - patient participated  Level of consciousness: awake  Pain score: 0 (See nurse's notes for pain score)  Pain management: adequate  Airway patency: patent  Anesthetic complications: No anesthetic complications  PONV Status: none  Cardiovascular status: acceptable  Respiratory status: acceptable  Hydration status: acceptable    Comments: Patient seen and examined postoperatively; vital signs stable; SpO2 greater than or equal to 90%; cardiopulmonary status stable; nausea/vomiting adequately controlled; pain adequately controlled; no apparent anesthesia complications; patient discharged from anesthesia care when discharge criteria were met

## 2019-07-08 NOTE — ANESTHESIA PREPROCEDURE EVALUATION
Anesthesia Evaluation     Patient summary reviewed and Nursing notes reviewed   NPO Solid Status: > 8 hours  NPO Liquid Status: > 8 hours           Airway   Mallampati: II  TM distance: >3 FB  Neck ROM: full  Dental    (+) poor dentition    Pulmonary - negative pulmonary ROS and normal exam   Cardiovascular - negative cardio ROS    Rhythm: regular  Rate: normal        Neuro/Psych- negative ROS  GI/Hepatic/Renal/Endo - negative ROS     Musculoskeletal (-) negative ROS    Abdominal  - normal exam   Substance History - negative use     OB/GYN negative ob/gyn ROS         Other                        Anesthesia Plan    ASA 3     general with block   (Post op tap)  intravenous induction   Anesthetic plan, all risks, benefits, and alternatives have been provided, discussed and informed consent has been obtained with: patient and spouse/significant other.    Plan discussed with CAA.

## 2019-07-09 LAB
ANION GAP SERPL CALCULATED.3IONS-SCNC: 13.5 MMOL/L (ref 5–15)
BASOPHILS # BLD AUTO: 0 10*3/MM3 (ref 0–0.2)
BASOPHILS NFR BLD AUTO: 0.1 % (ref 0–1.5)
BUN BLD-MCNC: 11 MG/DL (ref 8–20)
BUN/CREAT SERPL: 8.5 (ref 6.2–20.3)
CALCIUM SPEC-SCNC: 8.1 MG/DL (ref 8.9–10.3)
CHLORIDE SERPL-SCNC: 104 MMOL/L (ref 101–111)
CO2 SERPL-SCNC: 21 MMOL/L (ref 22–32)
CREAT BLD-MCNC: 1.3 MG/DL (ref 0.7–1.2)
DEPRECATED RDW RBC AUTO: 44.2 FL (ref 37–54)
EOSINOPHIL # BLD AUTO: 0 10*3/MM3 (ref 0–0.4)
EOSINOPHIL NFR BLD AUTO: 0 % (ref 0.3–6.2)
ERYTHROCYTE [DISTWIDTH] IN BLOOD BY AUTOMATED COUNT: 13.6 % (ref 12.3–15.4)
GFR SERPL CREATININE-BSD FRML MDRD: 61 ML/MIN/1.73
GLUCOSE BLD-MCNC: 149 MG/DL (ref 65–99)
HCT VFR BLD AUTO: 40.8 % (ref 37.5–51)
HGB BLD-MCNC: 13.8 G/DL (ref 13–17.7)
LAB AP CASE REPORT: NORMAL
LYMPHOCYTES # BLD AUTO: 1 10*3/MM3 (ref 0.7–3.1)
LYMPHOCYTES NFR BLD AUTO: 8.9 % (ref 19.6–45.3)
MCH RBC QN AUTO: 31.9 PG (ref 26.6–33)
MCHC RBC AUTO-ENTMCNC: 33.9 G/DL (ref 31.5–35.7)
MCV RBC AUTO: 94.1 FL (ref 79–97)
MONOCYTES # BLD AUTO: 0.5 10*3/MM3 (ref 0.1–0.9)
MONOCYTES NFR BLD AUTO: 4.6 % (ref 5–12)
NEUTROPHILS # BLD AUTO: 10 10*3/MM3 (ref 1.7–7)
NEUTROPHILS NFR BLD AUTO: 86.4 % (ref 42.7–76)
NRBC BLD AUTO-RTO: 0 /100 WBC (ref 0–0.2)
PATH REPORT.FINAL DX SPEC: NORMAL
PATH REPORT.GROSS SPEC: NORMAL
PLATELET # BLD AUTO: 269 10*3/MM3 (ref 140–450)
PMV BLD AUTO: 8.4 FL (ref 6–12)
POTASSIUM BLD-SCNC: 4.5 MMOL/L (ref 3.6–5.1)
RBC # BLD AUTO: 4.34 10*6/MM3 (ref 4.14–5.8)
SODIUM BLD-SCNC: 134 MMOL/L (ref 136–144)
WBC NRBC COR # BLD: 11.6 10*3/MM3 (ref 3.4–10.8)

## 2019-07-09 PROCEDURE — 80048 BASIC METABOLIC PNL TOTAL CA: CPT | Performed by: SURGERY

## 2019-07-09 PROCEDURE — 85025 COMPLETE CBC W/AUTO DIFF WBC: CPT | Performed by: SURGERY

## 2019-07-09 PROCEDURE — 25010000003 CEFAZOLIN PER 500 MG: Performed by: SURGERY

## 2019-07-09 PROCEDURE — 97161 PT EVAL LOW COMPLEX 20 MIN: CPT

## 2019-07-09 PROCEDURE — 25010000002 HYDROMORPHONE PER 4 MG: Performed by: SURGERY

## 2019-07-09 PROCEDURE — 25010000002 ENOXAPARIN PER 10 MG: Performed by: SURGERY

## 2019-07-09 RX ADMIN — ENOXAPARIN SODIUM 40 MG: 40 INJECTION SUBCUTANEOUS at 15:36

## 2019-07-09 RX ADMIN — DEXTROSE MONOHYDRATE AND SODIUM CHLORIDE 125 ML/HR: 5; .45 INJECTION, SOLUTION INTRAVENOUS at 11:55

## 2019-07-09 RX ADMIN — HYDROMORPHONE HYDROCHLORIDE 2 MG: 2 TABLET ORAL at 18:47

## 2019-07-09 RX ADMIN — HYDROMORPHONE HYDROCHLORIDE 1 MG: 2 INJECTION, SOLUTION INTRAMUSCULAR; INTRAVENOUS; SUBCUTANEOUS at 21:20

## 2019-07-09 RX ADMIN — CEFAZOLIN SODIUM 1 G: 1 INJECTION, POWDER, FOR SOLUTION INTRAMUSCULAR; INTRAVENOUS at 04:35

## 2019-07-09 RX ADMIN — FAMOTIDINE 20 MG: 20 TABLET, FILM COATED ORAL at 21:06

## 2019-07-09 RX ADMIN — HYDROMORPHONE HYDROCHLORIDE 2 MG: 2 TABLET ORAL at 08:32

## 2019-07-09 RX ADMIN — Medication 3 ML: at 21:07

## 2019-07-09 RX ADMIN — DEXTROSE MONOHYDRATE AND SODIUM CHLORIDE 125 ML/HR: 5; .45 INJECTION, SOLUTION INTRAVENOUS at 02:36

## 2019-07-09 RX ADMIN — HYDROMORPHONE HYDROCHLORIDE 1 MG: 2 INJECTION, SOLUTION INTRAMUSCULAR; INTRAVENOUS; SUBCUTANEOUS at 02:37

## 2019-07-09 RX ADMIN — HYDROMORPHONE HYDROCHLORIDE 1 MG: 2 INJECTION, SOLUTION INTRAMUSCULAR; INTRAVENOUS; SUBCUTANEOUS at 13:27

## 2019-07-09 RX ADMIN — FAMOTIDINE 20 MG: 20 TABLET, FILM COATED ORAL at 08:32

## 2019-07-09 RX ADMIN — HYDROMORPHONE HYDROCHLORIDE 1 MG: 2 INJECTION, SOLUTION INTRAMUSCULAR; INTRAVENOUS; SUBCUTANEOUS at 06:13

## 2019-07-09 RX ADMIN — HYDROMORPHONE HYDROCHLORIDE 2 MG: 2 TABLET ORAL at 12:01

## 2019-07-10 PROCEDURE — 25010000002 HYDROMORPHONE PER 4 MG: Performed by: SURGERY

## 2019-07-10 PROCEDURE — 25010000002 ENOXAPARIN PER 10 MG: Performed by: SURGERY

## 2019-07-10 PROCEDURE — 25010000002 ONDANSETRON PER 1 MG: Performed by: SURGERY

## 2019-07-10 RX ADMIN — FAMOTIDINE 20 MG: 20 TABLET, FILM COATED ORAL at 08:21

## 2019-07-10 RX ADMIN — HYDROMORPHONE HYDROCHLORIDE 1 MG: 2 INJECTION, SOLUTION INTRAMUSCULAR; INTRAVENOUS; SUBCUTANEOUS at 18:52

## 2019-07-10 RX ADMIN — ENOXAPARIN SODIUM 40 MG: 40 INJECTION SUBCUTANEOUS at 16:35

## 2019-07-10 RX ADMIN — DEXTROSE MONOHYDRATE AND SODIUM CHLORIDE 125 ML/HR: 5; .45 INJECTION, SOLUTION INTRAVENOUS at 21:23

## 2019-07-10 RX ADMIN — HYDROMORPHONE HYDROCHLORIDE 1 MG: 2 INJECTION, SOLUTION INTRAMUSCULAR; INTRAVENOUS; SUBCUTANEOUS at 23:32

## 2019-07-10 RX ADMIN — HYDROMORPHONE HYDROCHLORIDE 1 MG: 2 INJECTION, SOLUTION INTRAMUSCULAR; INTRAVENOUS; SUBCUTANEOUS at 08:25

## 2019-07-10 RX ADMIN — FAMOTIDINE 20 MG: 20 TABLET, FILM COATED ORAL at 21:23

## 2019-07-10 RX ADMIN — ONDANSETRON 4 MG: 2 INJECTION INTRAMUSCULAR; INTRAVENOUS at 21:23

## 2019-07-10 RX ADMIN — Medication 3 ML: at 21:23

## 2019-07-10 RX ADMIN — DEXTROSE MONOHYDRATE AND SODIUM CHLORIDE 125 ML/HR: 5; .45 INJECTION, SOLUTION INTRAVENOUS at 03:56

## 2019-07-10 RX ADMIN — Medication 3 ML: at 08:21

## 2019-07-10 RX ADMIN — HYDROMORPHONE HYDROCHLORIDE 1 MG: 2 INJECTION, SOLUTION INTRAMUSCULAR; INTRAVENOUS; SUBCUTANEOUS at 13:13

## 2019-07-10 RX ADMIN — DEXTROSE MONOHYDRATE AND SODIUM CHLORIDE 125 ML/HR: 5; .45 INJECTION, SOLUTION INTRAVENOUS at 11:52

## 2019-07-10 RX ADMIN — HYDROMORPHONE HYDROCHLORIDE 1 MG: 2 INJECTION, SOLUTION INTRAMUSCULAR; INTRAVENOUS; SUBCUTANEOUS at 03:55

## 2019-07-11 VITALS
HEART RATE: 51 BPM | OXYGEN SATURATION: 93 % | SYSTOLIC BLOOD PRESSURE: 111 MMHG | HEIGHT: 71 IN | RESPIRATION RATE: 14 BRPM | BODY MASS INDEX: 33.6 KG/M2 | TEMPERATURE: 97.8 F | WEIGHT: 240 LBS | DIASTOLIC BLOOD PRESSURE: 68 MMHG

## 2019-07-11 PROBLEM — Z90.49 S/P COLON RESECTION: Status: RESOLVED | Noted: 2019-07-08 | Resolved: 2019-07-11

## 2019-07-11 PROCEDURE — 25010000002 ENOXAPARIN PER 10 MG: Performed by: SURGERY

## 2019-07-11 PROCEDURE — 25010000002 HYDROMORPHONE PER 4 MG: Performed by: SURGERY

## 2019-07-11 RX ORDER — HYDROCODONE BITARTRATE AND ACETAMINOPHEN 5; 325 MG/1; MG/1
1-2 TABLET ORAL EVERY 4 HOURS PRN
Qty: 30 TABLET | Refills: 0 | Status: SHIPPED | OUTPATIENT
Start: 2019-07-11 | End: 2019-07-18

## 2019-07-11 RX ORDER — HYDROCODONE BITARTRATE AND ACETAMINOPHEN 5; 325 MG/1; MG/1
1 TABLET ORAL EVERY 6 HOURS PRN
Status: DISCONTINUED | OUTPATIENT
Start: 2019-07-11 | End: 2019-07-11 | Stop reason: HOSPADM

## 2019-07-11 RX ADMIN — ENOXAPARIN SODIUM 40 MG: 40 INJECTION SUBCUTANEOUS at 16:02

## 2019-07-11 RX ADMIN — HYDROMORPHONE HYDROCHLORIDE 1 MG: 2 INJECTION, SOLUTION INTRAMUSCULAR; INTRAVENOUS; SUBCUTANEOUS at 06:00

## 2019-07-11 RX ADMIN — DEXTROSE MONOHYDRATE AND SODIUM CHLORIDE 125 ML/HR: 5; .45 INJECTION, SOLUTION INTRAVENOUS at 12:13

## 2019-07-11 RX ADMIN — HYDROCODONE BITARTRATE AND ACETAMINOPHEN 1 TABLET: 5; 325 TABLET ORAL at 17:15

## 2019-07-11 RX ADMIN — FAMOTIDINE 20 MG: 20 TABLET, FILM COATED ORAL at 07:59

## 2019-07-11 RX ADMIN — HYDROMORPHONE HYDROCHLORIDE 1 MG: 2 INJECTION, SOLUTION INTRAMUSCULAR; INTRAVENOUS; SUBCUTANEOUS at 14:01

## 2019-07-11 RX ADMIN — HYDROMORPHONE HYDROCHLORIDE 1 MG: 2 INJECTION, SOLUTION INTRAMUSCULAR; INTRAVENOUS; SUBCUTANEOUS at 10:44

## 2019-07-12 ENCOUNTER — READMISSION MANAGEMENT (OUTPATIENT)
Dept: CALL CENTER | Facility: HOSPITAL | Age: 39
End: 2019-07-12

## 2019-07-13 NOTE — OUTREACH NOTE
Prep Survey      Responses   Facility patient discharged from?  [facility]   Is patient eligible?  Yes   Discharge diagnosis  Lap sigmoid colectomy   Does the patient have one of the following disease processes/diagnoses(primary or secondary)?  General Surgery   Does the patient have Home health ordered?  No   Is there a DME ordered?  No   Prep survey completed?  Yes          Rosa Isela Son RN

## 2019-07-15 ENCOUNTER — READMISSION MANAGEMENT (OUTPATIENT)
Dept: CALL CENTER | Facility: HOSPITAL | Age: 39
End: 2019-07-15

## 2019-07-15 NOTE — OUTREACH NOTE
General Surgery Week 1 Survey      Responses   Facility patient discharged from?  [facility]   Does the patient have one of the following disease processes/diagnoses(primary or secondary)?  General Surgery   Is there a successful TCM telephone encounter documented?  No   Week 1 attempt successful?  No   Rescheduled  Revoked   Revoke  Decline to participate [Left voicemail]          Coco Schmitz LPN

## 2019-08-28 ENCOUNTER — OFFICE VISIT (OUTPATIENT)
Dept: ORTHOPEDIC SURGERY | Facility: CLINIC | Age: 39
End: 2019-08-28

## 2019-08-28 VITALS
WEIGHT: 242.2 LBS | HEART RATE: 73 BPM | SYSTOLIC BLOOD PRESSURE: 111 MMHG | DIASTOLIC BLOOD PRESSURE: 78 MMHG | HEIGHT: 71 IN | BODY MASS INDEX: 33.91 KG/M2

## 2019-08-28 DIAGNOSIS — M47.812 FACET ARTHRITIS OF CERVICAL REGION: Primary | ICD-10-CM

## 2019-08-28 PROCEDURE — 99213 OFFICE O/P EST LOW 20 MIN: CPT | Performed by: ORTHOPAEDIC SURGERY

## 2019-08-28 RX ORDER — ACETAMINOPHEN 500 MG
500 TABLET ORAL EVERY 6 HOURS PRN
COMMUNITY

## 2019-08-28 NOTE — PROGRESS NOTES
Visit Type: Follow Up  Referring Provider: No ref. provider found  Primary Care Provider: Sergio Gomez MD    Patient Name: Armaan Smith  Patient Age: 39 y.o.  Chief Complaint: had concerns including Follow-up of the Lumbar Spine and Pain of the Cervical Spine (Increased pain since removal of part of colon).    Subjective   History of Present Illness: This patient is a 39 years old overweight gentleman who is a status post lumbar discectomy decompression of L4-5 and L5-S1 patient also was diagnosed with right sacroiliac joint dysfunction underwent right sacroiliac joint block with good result patient was diagnosed with diverticulitis underwent partial resection of colon and his abdominal pain is better.  Patient also complains of axial neck pain bilateral upper extremity pain left worse than right with tingling and numbness of his hands.      Review of Systems   Gastrointestinal: Negative for abdominal distention.   Musculoskeletal: Positive for back pain, myalgias, neck pain and neck stiffness.   Neurological: Positive for numbness.       The following portions of the patient's history were reviewed and updated as appropriate: allergies, current medications, past family history, past medical history, past social history, past surgical history and problem list.    Past Medical History:   Diagnosis Date   • Arthritis    • Back pain    • DDD (degenerative disc disease), cervical    • DDD (degenerative disc disease), lumbosacral    • Diverticulitis    • Diverticulitis    • Facet joint disease of cervical region    • GERD (gastroesophageal reflux disease)    • Glaucoma 2019    early stages   • Herniated muscle    • Hip pain    • Intestinal abscess    • Migraine headache    • Numbness    • Sacrococcygeal disorders, not elsewhere classified        Past Surgical History:   Procedure Laterality Date   • BACK SURGERY      lumbar   • COLON RESECTION N/A 7/8/2019    Procedure: LAPAROSCOPIC SIGMOID COLECTOMY;   Surgeon: Idania Umanzor MD;  Location: [facility] MAIN OR;  Service: General   • COLONOSCOPY     • FOOT SURGERY Right    • FRACTURE SURGERY      removed hardware in second surgery   • OTHER SURGICAL HISTORY Right 09/10/2018    Micro right L4-S1 -- Dr. Waters    • WISDOM TOOTH EXTRACTION         Vitals:    08/28/19 0956   BP: 111/78   Pulse: 73       Patient Active Problem List   Diagnosis   (none) - all problems resolved or deleted       Family History Summary:  family history includes Colon cancer in his mother; Prostate cancer in his brother and father.    Social History     Socioeconomic History   • Marital status:      Spouse name: Not on file   • Number of children: Not on file   • Years of education: Not on file   • Highest education level: Not on file   Tobacco Use   • Smoking status: Current Every Day Smoker   • Smokeless tobacco: Never Used   • Tobacco comment: switched to vape   Substance and Sexual Activity   • Alcohol use: No     Frequency: Never   • Drug use: No         Current Outpatient Medications:   •  acetaminophen (TYLENOL) 500 MG tablet, Take 500 mg by mouth Every 6 (Six) Hours As Needed for Mild Pain ., Disp: , Rfl:     Allergies   Allergen Reactions   • Contrast Dye Rash   • Demerol [Meperidine] Shortness Of Breath     B/P dropped   • Hydrocodone-Acetaminophen Unknown (See Comments)   • Latex, Natural Rubber Rash     Burn like area, blisters   • Morphine Shortness Of Breath     Dropped b/p   • Other Rash     Dissolving sutures, Vicryl, caused rash, redness and infection   • Tramadol Rash           Objective   Physical Exam   Neck: Normal carotid pulses present. Spinous process tenderness and muscular tenderness present. Decreased range of motion present. No thyroid mass and no thyromegaly present.   Compression distraction test are positive shoulder abduction test negative, muscle testing of upper extremities 5/5     Back Exam     Tenderness   The patient is experiencing tenderness in the  lumbar and sacroiliac.    Range of Motion   Extension:  40 abnormal   Flexion:  50 abnormal   Lateral bend right:  40 abnormal   Lateral bend left:  40 abnormal   Rotation right:  40 abnormal   Rotation left:  40 abnormal     Tests   Straight leg raise right: negative  Straight leg raise left: negative    Reflexes   Patellar: normal  Achilles: normal  Biceps: normal  Babinski's sign: normal     Other   Toe walk: normal  Heel walk: normal  Sensation: normal  Gait: normal               Impression and Plan: Patient is here today for follow-up patient is a status post lumbar decompression discectomy of L4-S1 patient stated to me he has the radicular pain is better he continues to have axial lower back pain patient was diagnosed with right sacral iliac joint dysfunction underwent CT-guided injection with good result also patient was diagnosed with diverticulitis underwent partial resection of colon with good result.  Patient is complaining of axial neck pain with radiation of pain to his upper extremities left worse than right tingling and numbness of his hands, the patient had MRI of cervical spine 2 years ago which it was demonstrating facet arthropathy of C4-C7 and MRI of lumbar spine is compatible with disc degeneration desiccation of L3-S1  Plan; I do not have any surgical plan for this patient my recommendation is to do weight reduction, rehabilitation pain management to proceed with facet block and radio frequency of C4-C7 and L3-S1 I would like to see this patient my office next 3 months.    Facet arthritis of cervical region (CMS/[facility]) [M46.92]     Orders Placed This Encounter   Procedures   • Ambulatory Referral to Pain Management     Referral Priority:   Routine     Referral Type:   Pain Management     Referral Reason:   Specialty Services Required     Referred to Provider:   Benson Jarquin MD     Requested Specialty:   Pain Medicine     Number of Visits Requested:   1               Betty Waters,  MD  08/28/19  10:30 AM

## 2019-10-09 ENCOUNTER — TELEPHONE (OUTPATIENT)
Dept: PAIN MEDICINE | Facility: CLINIC | Age: 39
End: 2019-10-09

## 2019-10-17 ENCOUNTER — HOSPITAL ENCOUNTER (OUTPATIENT)
Dept: PAIN MEDICINE | Facility: HOSPITAL | Age: 39
Discharge: HOME | End: 2019-10-17
Admitting: PHYSICAL MEDICINE & REHABILITATION

## 2019-10-17 VITALS
HEIGHT: 71 IN | SYSTOLIC BLOOD PRESSURE: 122 MMHG | DIASTOLIC BLOOD PRESSURE: 86 MMHG | HEART RATE: 67 BPM | OXYGEN SATURATION: 98 % | WEIGHT: 240 LBS | BODY MASS INDEX: 33.6 KG/M2 | RESPIRATION RATE: 16 BRPM | TEMPERATURE: 97.9 F

## 2019-10-17 DIAGNOSIS — M47.817 LUMBOSACRAL SPONDYLOSIS WITHOUT MYELOPATHY: ICD-10-CM

## 2019-10-17 DIAGNOSIS — G89.29 CHRONIC BILATERAL LOW BACK PAIN, UNSPECIFIED WHETHER SCIATICA PRESENT: Primary | ICD-10-CM

## 2019-10-17 DIAGNOSIS — M54.50 CHRONIC BILATERAL LOW BACK PAIN, UNSPECIFIED WHETHER SCIATICA PRESENT: Primary | ICD-10-CM

## 2019-10-17 PROBLEM — M51.16 LUMBAR DISC HERNIATION WITH RADICULOPATHY: Status: ACTIVE | Noted: 2017-05-09

## 2019-10-17 PROBLEM — M54.17 LUMBOSACRAL RADICULOPATHY: Status: ACTIVE | Noted: 2017-05-09

## 2019-10-17 PROBLEM — M54.2 NECK PAIN: Status: ACTIVE | Noted: 2017-05-09

## 2019-10-17 PROBLEM — M53.3 SACROILIAC JOINT DYSFUNCTION: Status: ACTIVE | Noted: 2018-12-12

## 2019-10-17 PROBLEM — M50.30 OTHER CERVICAL DISC DEGENERATION, UNSPECIFIED CERVICAL REGION: Status: ACTIVE | Noted: 2017-05-09

## 2019-10-17 PROBLEM — M50.10 CERVICAL DISC DISORDER WITH RADICULOPATHY: Status: ACTIVE | Noted: 2017-07-12

## 2019-10-17 PROCEDURE — 64493 INJ PARAVERT F JNT L/S 1 LEV: CPT | Performed by: PHYSICAL MEDICINE & REHABILITATION

## 2019-10-17 PROCEDURE — 64495 INJ PARAVERT F JNT L/S 3 LEV: CPT | Performed by: PHYSICAL MEDICINE & REHABILITATION

## 2019-10-17 PROCEDURE — 64494 INJ PARAVERT F JNT L/S 2 LEV: CPT | Performed by: PHYSICAL MEDICINE & REHABILITATION

## 2019-10-17 RX ORDER — BUPIVACAINE HYDROCHLORIDE 5 MG/ML
INJECTION, SOLUTION EPIDURAL; INTRACAUDAL
Status: DISCONTINUED
Start: 2019-10-17 | End: 2019-10-18 | Stop reason: HOSPADM

## 2019-10-17 RX ORDER — LIDOCAINE HYDROCHLORIDE 10 MG/ML
INJECTION, SOLUTION EPIDURAL; INFILTRATION; INTRACAUDAL; PERINEURAL
Status: DISCONTINUED
Start: 2019-10-17 | End: 2019-10-18 | Stop reason: HOSPADM

## 2019-10-17 NOTE — PATIENT INSTRUCTIONS
Facet Joint Block, Care After  Refer to this sheet in the next few weeks. These instructions provide you with information about caring for yourself after your procedure. Your health care provider may also give you more specific instructions. Your treatment has been planned according to current medical practices, but problems sometimes occur. Call your health care provider if you have any problems or questions after your procedure.  What can I expect after the procedure?  After the procedure, it is common to have:  · Some tenderness over the injection sites for 2 days after the procedure.  · A temporary increase in blood sugar if you have diabetes.  Follow these instructions at home:  · Keep track of the amount of pain relief you feel and how long it lasts.  · Take over-the-counter and prescription medicines only as told by your health care provider. You may need to limit pain medicine within the first 4-6 hours after the procedure.  · Remove your bandages (dressings) the morning after the procedure.  · For the first 24 hours after the procedure:  ? Do not apply heat near or over the injection sites.  ? Do not take a bath or soak in water, such as in a pool or lake.  ? Do not drive or operate heavy machinery unless approved by your health care provider.  ? Avoid activities that require a lot of energy.  · If the injection site is tender, try applying ice to the area. To do this:  ? Put ice in a plastic bag.  ? Place a towel between your skin and the bag.  ? Leave the ice on for 20 minutes, 2-3 times a day.  · Keep all follow-up visits as told by your health care provider. This is important.  Contact a health care provider if:  · Fluid is coming from an injection site.  · There is significant bleeding or swelling at an injection site.  · You have diabetes and your blood sugar is above 180 mg/dL.  Get help right away if:  · You have a fever.  · You have worsening pain or swelling around an injection site.  · There are  red streaks around an injection site.  · You develop severe pain that is not controlled by your medicines.  · You develop a headache, stiff neck, nausea, or vomiting.  · Your eyes become very sensitive to light.  · You have weakness, paralysis, or tingling in your arms or legs that was not present before the procedure.  · You have difficulty urinating or breathing.  This information is not intended to replace advice given to you by your health care provider. Make sure you discuss any questions you have with your health care provider.  Document Released: 12/04/2013 Document Revised: 05/03/2017 Document Reviewed: 09/12/2016  [employer] Interactive Patient Education © 2019 [employer]

## 2019-10-17 NOTE — H&P
Patient Care Team:  Abran Tafoya MD as PCP - General    Chief complaint Low back pain    Subjective     neck/low back pain for many years, 10/10 at worst, 5/10 at best, 4/10 today, aching, sharp, nonradiating, interferes with work at [employer], no focal weakness or numbness, no b/b incontinence, worse with activity, improves with rest. MRI L-spine with DDD and DJD. Seen by Dr. Waters, referred for L4-S1 ESIs. Had 1st right L4/5 ILESI. Underwent decompression. Rereferred for L3-S1 and C4-7 MBB and RFA. Here for 1st right L3-S1 MBB.        Review of Systems   Respiratory: Negative for shortness of breath.    Cardiovascular: Negative for chest pain.   Musculoskeletal: Positive for back pain and neck pain.        Past Medical History:   Diagnosis Date   • Arthritis    • Back pain    • DDD (degenerative disc disease), cervical    • DDD (degenerative disc disease), lumbosacral    • Diverticulitis    • Diverticulitis    • Facet joint disease of cervical region    • GERD (gastroesophageal reflux disease)    • Glaucoma 2019    early stages   • Herniated muscle    • Hip pain    • Intestinal abscess    • Migraine headache    • Numbness    • Sacrococcygeal disorders, not elsewhere classified      Past Surgical History:   Procedure Laterality Date   • BACK SURGERY      lumbar   • COLON RESECTION N/A 7/8/2019    Procedure: LAPAROSCOPIC SIGMOID COLECTOMY;  Surgeon: Idania Umanzor MD;  Location: [facility];  Service: General   • COLONOSCOPY     • FOOT SURGERY Right    • FRACTURE SURGERY      removed hardware in second surgery   • OTHER SURGICAL HISTORY Right 09/10/2018    Micro right L4-S1 -- Dr. Waters    • WISDOM TOOTH EXTRACTION       Family History   Problem Relation Age of Onset   • Colon cancer Mother    • Prostate cancer Father    • Prostate cancer Brother      Social History     Tobacco Use   • Smoking status: Current Every Day Smoker   • Smokeless tobacco: Never Used   • Tobacco comment: switched to vape    Substance Use Topics   • Alcohol use: No     Frequency: Never   • Drug use: No       (Not in a hospital admission)  Allergies:  Contrast dye; Demerol [meperidine]; Hydrocodone-acetaminophen; Latex, natural rubber; Morphine; Other; and Tramadol    Objective      Vital Signs  Temp:  [97.9 °F (36.6 °C)] 97.9 °F (36.6 °C)  Heart Rate:  [83] 83  Resp:  [16] 16  BP: (121)/(83) 121/83    Physical Exam   Cardiovascular: Normal rate, regular rhythm and normal heart sounds.   Pulmonary/Chest: Effort normal and breath sounds normal.       Results Review:   None      Assessment/Plan       * No active hospital problems. *      ICD-10-CM ICD-9-CM   1. Chronic bilateral low back pain, unspecified whether sciatica present M54.5 724.2    G89.29 338.29   2. Lumbosacral spondylosis without myelopathy M47.817 721.3         Assessment & Plan    I discussed the patients findings and my recommendations with patient     Perform 1st of 2 right L3-S1 MBB for RFA.  Plan left L3-S1 MBB and RFA on left.  Then plan right then left C4-7 MBB and RFA.  No change to meds today.  RTC for procedures.      Lumbar Medial Branch Block    PREOPERATIVE DIAGNOSIS: Lumbar spondylosis    POSTOPERATIVE DIAGNOSIS: Lumbar spondylosis    PROCEDURE PERFORMED: Lumbar Medial Branch Block, right L3/4, L4/5, L5/S1    The patient presents with a history of lumbosacral spondylosis [ bilaterally ] at level L4, L5, sacral ala.  The patient presents today for a lumbar medial branch block. This is the [ first ] procedure. The patient understands the risks and benefits of the procedure and wishes to proceed. The patient was seen in the preoperative area.  Patient's consent was obtained and updated.  Vitals were taken.  Patient was then brought to the procedure suite and placed in a prone position. The appropriate anatomic area was widely prepped with Chloroprep and draped in a sterile fashion.  Under fluoroscopic guidance an AP view with no tilt was obtained. A 22 gauge  curved tip spinal needle was passed through skin anesthetized with 1% Lidocaine without epinephrine. The needle tip was guided into the superior medial aspect of the transverse process at L4, L5, sacral ala.   At this point 0.5 mL of 0.25% Marcaine was injected. A sterile dressing was placed over the puncture sites. The patient tolerated the procedure with [ no complications ]. They were then brought to the post procedure area where they recovered nicely.    Approximately 25 minutes after procedure the patient reported [  ]% relief from intervention.    Discharge:  The patient will be discharged home in stable condition.   Patient understands to contact the Center with any post procedure questions or concerns.  Discharge instructions given by nursing staff.      Benson Jarquin MD  10/17/19  3:06 PM    Time: Discharge 15 min

## 2019-11-26 ENCOUNTER — OFFICE VISIT (OUTPATIENT)
Dept: ORTHOPEDIC SURGERY | Facility: CLINIC | Age: 39
End: 2019-11-26

## 2019-11-26 VITALS
DIASTOLIC BLOOD PRESSURE: 74 MMHG | HEART RATE: 84 BPM | SYSTOLIC BLOOD PRESSURE: 112 MMHG | WEIGHT: 253.4 LBS | HEIGHT: 71 IN | BODY MASS INDEX: 35.48 KG/M2

## 2019-11-26 DIAGNOSIS — M51.37 DDD (DEGENERATIVE DISC DISEASE), LUMBOSACRAL: Primary | ICD-10-CM

## 2019-11-26 PROCEDURE — 99213 OFFICE O/P EST LOW 20 MIN: CPT | Performed by: ORTHOPAEDIC SURGERY

## 2019-11-26 NOTE — PROGRESS NOTES
Visit Type: Follow Up  Referring Provider: No ref. provider found  Primary Care Provider: Abran Tafoya MD    Patient Name: Armaan Smith  Patient Age: 39 y.o.  Chief Complaint: had concerns including Follow-up and Pain of the Lumbar Spine (3 month follow up, RFA not help).    Subjective   History of Present Illness: This patient is here today for follow-up the patient is complaining of axial lower back pain with radiation of pain to his lower extremities the patient is a status post microdiscectomy of L4-S1 also the patient was diagnosed with sacroiliac joint dysfunction underwent injection without significant good result.      Review of Systems   Musculoskeletal: Positive for back pain, myalgias, neck pain and neck stiffness.   Neurological: Positive for numbness.       The following portions of the patient's history were reviewed and updated as appropriate: allergies, current medications, past family history, past medical history, past social history, past surgical history and problem list.    Past Medical History:   Diagnosis Date   • Arthritis    • Back pain    • DDD (degenerative disc disease), cervical    • DDD (degenerative disc disease), lumbosacral    • Diverticulitis    • Diverticulitis    • Facet joint disease of cervical region    • GERD (gastroesophageal reflux disease)    • Glaucoma 2019    early stages   • Herniated muscle    • Hip pain    • Intestinal abscess    • Migraine headache    • Numbness    • Sacrococcygeal disorders, not elsewhere classified        Past Surgical History:   Procedure Laterality Date   • BACK SURGERY      lumbar   • COLON RESECTION N/A 7/8/2019    Procedure: LAPAROSCOPIC SIGMOID COLECTOMY;  Surgeon: Idania Umanzor MD;  Location: [facility] OR;  Service: General   • COLONOSCOPY     • FOOT SURGERY Right    • FRACTURE SURGERY      removed hardware in second surgery   • OTHER SURGICAL HISTORY Right 09/10/2018    Micro right L4-S1 -- Dr. Waters    • WISDOM TOOTH  EXTRACTION         Vitals:    11/26/19 0946   BP: 112/74   Pulse: 84       Patient Active Problem List   Diagnosis   • Low back pain   • Neck pain   • Lumbar disc herniation with radiculopathy   • Lumbosacral radiculopathy   • Other cervical disc degeneration, unspecified cervical region   • Cervical disc disorder with radiculopathy   • Sacroiliac joint dysfunction   • Lumbosacral spondylosis without myelopathy       Family History Summary:  family history includes Colon cancer in his mother; Prostate cancer in his brother and father.    Social History     Socioeconomic History   • Marital status:      Spouse name: Not on file   • Number of children: Not on file   • Years of education: Not on file   • Highest education level: Not on file   Tobacco Use   • Smoking status: Current Every Day Smoker   • Smokeless tobacco: Never Used   • Tobacco comment: switched to vape   Substance and Sexual Activity   • Alcohol use: No     Frequency: Never   • Drug use: No         Current Outpatient Medications:   •  acetaminophen (TYLENOL) 500 MG tablet, Take 500 mg by mouth Every 6 (Six) Hours As Needed for Mild Pain ., Disp: , Rfl:     Allergies   Allergen Reactions   • Contrast Dye Rash   • Demerol [Meperidine] Shortness Of Breath     B/P dropped   • Hydrocodone-Acetaminophen Unknown (See Comments)   • Latex, Natural Rubber Rash     Burn like area, blisters   • Morphine Shortness Of Breath     Dropped b/p   • Other Rash     Dissolving sutures, Vicryl, caused rash, redness and infection   • Tramadol Rash           Objective   Physical Exam  Back Exam     Tenderness   The patient is experiencing tenderness in the lumbar.    Range of Motion   Extension:  50 abnormal   Flexion:  50 abnormal   Lateral bend right:  30 abnormal   Lateral bend left:  40 abnormal   Rotation right: 30   Rotation left: 30     Muscle Strength   The patient has normal back strength.    Tests   Straight leg raise right: positive at 70 deg  Straight leg  raise left: positive at 70 deg    Reflexes   Patellar: normal  Achilles: normal  Babinski's sign: normal     Other   Heel walk: normal  Sensation: normal  Gait: normal               Impression and Plan: This patient is here today for follow-up the patient is complaining of axial lower back pain with radiation of pain to his lower extremities patient is a status post microdiscectomy of L4-S1 patient is stated to me his pain is not getting better the patient was diagnosed with sacroiliac joint dysfunction underwent injection of sacroiliac joint and he has stated to me the injection did not help I am going to send this patient to obtain MRI of lumbar spine would like to see this patient my office next 6 weeks    DDD (degenerative disc disease), lumbosacral [M51.37]     Orders Placed This Encounter   Procedures   • MRI Lumbar Spine Without Contrast     Standing Status:   Future     Standing Expiration Date:   11/26/2020               Betty Waters MD  11/26/19  12:36 PM

## 2019-12-04 ENCOUNTER — TELEPHONE (OUTPATIENT)
Dept: ORTHOPEDIC SURGERY | Facility: CLINIC | Age: 39
End: 2019-12-04

## 2019-12-04 NOTE — TELEPHONE ENCOUNTER
Call from Karli in [facility]'s dept regarding pts MRI Lumbar Spine. States it has been denied due to not having more specific conservative treatment. Pt has not had PT, has had injections with our pain management. Peer to peer can be done by calling [phone] by 12/13/2019.   Would you please review and determine if you would be willing to do peer to peer? Thank you!

## 2019-12-05 ENCOUNTER — HOSPITAL ENCOUNTER (OUTPATIENT)
Dept: MRI IMAGING | Facility: HOSPITAL | Age: 39
Discharge: HOME | End: 2019-12-05
Admitting: ORTHOPAEDIC SURGERY

## 2019-12-05 ENCOUNTER — APPOINTMENT (OUTPATIENT)
Dept: MRI IMAGING | Facility: HOSPITAL | Age: 39
End: 2019-12-05

## 2019-12-05 DIAGNOSIS — M51.37 DDD (DEGENERATIVE DISC DISEASE), LUMBOSACRAL: ICD-10-CM

## 2019-12-05 PROCEDURE — 72148 MRI LUMBAR SPINE W/O DYE: CPT

## 2019-12-10 ENCOUNTER — OFFICE VISIT (OUTPATIENT)
Dept: ORTHOPEDIC SURGERY | Facility: CLINIC | Age: 39
End: 2019-12-10

## 2019-12-10 VITALS
SYSTOLIC BLOOD PRESSURE: 122 MMHG | HEIGHT: 71 IN | BODY MASS INDEX: 35.14 KG/M2 | DIASTOLIC BLOOD PRESSURE: 81 MMHG | WEIGHT: 251 LBS | HEART RATE: 59 BPM

## 2019-12-10 DIAGNOSIS — M51.37 DDD (DEGENERATIVE DISC DISEASE), LUMBOSACRAL: Primary | ICD-10-CM

## 2019-12-10 PROCEDURE — 99213 OFFICE O/P EST LOW 20 MIN: CPT | Performed by: PHYSICIAN ASSISTANT

## 2019-12-10 NOTE — PROGRESS NOTES
Orthopedic Spine Follow Up    Referring Provider: No ref. provider found  Primary Care Provider: Sergio Gomez MD    Patient Name: Armaan Smith  Patient Age: 39 y.o.  Chief Complaint: had concerns including Follow-up and Pain of the Lumbar Spine.    History of Present Illness: Armaan Smith is a 39 y.o. year old male here in  Follow up today with chief complaint of had concerns including Follow-up and Pain of the Lumbar Spine..  This patient is status post L4-S1 microdiscectomy.  His pain did not improve and he underwent a right sacroiliac block.  He said he got relief for about 3 months after that but continued to have pain across his lower back.  The pain that improves his right buttock pain down his right leg.  Because this patient failed to have significant improvement in his lower back he was sent for an MRI of lumbar spine and is here today to review that.  Patient states he has no interest in any surgical intervention he is wants to make sure there is nothing terrible going on.    Imaging:  We have reviewed his MRI of lumbar spine together which demonstrates disc degeneration from L3-S1 with right stenosis at L4-5 and L5-S1 which is improved but continues.  He has significant disc height loss L4-S1.    Assessment:   1. DDD (degenerative disc disease), lumbosacral        Plan:  This point time the patient states no injections of ever helped and he does not want to start any new medication.  He prefers to handle this with activity modification for the time being.  He will follow-up in the office if things change.    Subjective     Review of Systems   Constitutional: Positive for activity change. Negative for fatigue and fever.   HENT: Negative for sore throat.    Eyes: Negative for double vision.   Respiratory: Negative for choking.    Gastrointestinal: Negative for abdominal pain and constipation.   Genitourinary: Negative for dysuria.   Musculoskeletal: Positive for back pain and gait  problem.   Skin: Negative for rash.   Neurological: Positive for numbness.   Hematological: Does not bruise/bleed easily.   Psychiatric/Behavioral: Positive for sleep disturbance.       The following portions of the patient's history were reviewed and updated as appropriate: allergies, current medications, past family history, past medical history, past social history, past surgical history and problem list.    Objective     Physical Exam   Constitutional: He is oriented to person, place, and time. He appears well-developed.   HENT:   Head: Normocephalic and atraumatic.   Eyes: Conjunctivae are normal.   Neck: Normal range of motion.   Cardiovascular: Normal rate.   Pulmonary/Chest: Effort normal.   Abdominal: There is no guarding.   Musculoskeletal: He exhibits tenderness.        Right hip: Normal.        Left hip: Normal.        Lumbar back: He exhibits decreased range of motion, tenderness and pain.   Neurological: He is oriented to person, place, and time.   Skin: Skin is warm.   Psychiatric: His behavior is normal.   Vitals reviewed.    Ortho Exam      1. DDD (degenerative disc disease), lumbosacral         No orders of the defined types were placed in this encounter.      Procedures

## 2020-08-13 ENCOUNTER — OFFICE (AMBULATORY)
Dept: URBAN - METROPOLITAN AREA CLINIC 64 | Facility: CLINIC | Age: 40
End: 2020-08-13

## 2020-08-13 VITALS
HEART RATE: 81 BPM | DIASTOLIC BLOOD PRESSURE: 74 MMHG | SYSTOLIC BLOOD PRESSURE: 139 MMHG | HEIGHT: 71 IN | WEIGHT: 232 LBS

## 2020-08-13 DIAGNOSIS — R10.31 RIGHT LOWER QUADRANT PAIN: ICD-10-CM

## 2020-08-13 DIAGNOSIS — K62.5 HEMORRHAGE OF ANUS AND RECTUM: ICD-10-CM

## 2020-08-13 DIAGNOSIS — R11.0 NAUSEA: ICD-10-CM

## 2020-08-13 DIAGNOSIS — K59.1 FUNCTIONAL DIARRHEA: ICD-10-CM

## 2020-08-13 DIAGNOSIS — K21.9 GASTRO-ESOPHAGEAL REFLUX DISEASE WITHOUT ESOPHAGITIS: ICD-10-CM

## 2020-08-13 DIAGNOSIS — K64.1 SECOND DEGREE HEMORRHOIDS: ICD-10-CM

## 2020-08-13 PROCEDURE — 99214 OFFICE O/P EST MOD 30 MIN: CPT | Performed by: NURSE PRACTITIONER

## 2020-09-24 ENCOUNTER — OFFICE (AMBULATORY)
Dept: URBAN - METROPOLITAN AREA CLINIC 64 | Facility: CLINIC | Age: 40
End: 2020-09-24

## 2020-09-24 VITALS
SYSTOLIC BLOOD PRESSURE: 121 MMHG | HEART RATE: 77 BPM | HEIGHT: 71 IN | WEIGHT: 238 LBS | DIASTOLIC BLOOD PRESSURE: 77 MMHG

## 2020-09-24 DIAGNOSIS — K59.1 FUNCTIONAL DIARRHEA: ICD-10-CM

## 2020-09-24 DIAGNOSIS — R10.31 RIGHT LOWER QUADRANT PAIN: ICD-10-CM

## 2020-09-24 DIAGNOSIS — K21.9 GASTRO-ESOPHAGEAL REFLUX DISEASE WITHOUT ESOPHAGITIS: ICD-10-CM

## 2020-09-24 DIAGNOSIS — R14.0 ABDOMINAL DISTENSION (GASEOUS): ICD-10-CM

## 2020-09-24 PROCEDURE — 99214 OFFICE O/P EST MOD 30 MIN: CPT | Performed by: NURSE PRACTITIONER

## 2020-09-24 RX ORDER — RIFAXIMIN 550 MG/1
1650 TABLET ORAL
Qty: 42 | Refills: 2 | Status: COMPLETED
Start: 2020-09-24 | End: 2020-10-29

## 2020-10-29 ENCOUNTER — OFFICE (AMBULATORY)
Dept: URBAN - METROPOLITAN AREA CLINIC 64 | Facility: CLINIC | Age: 40
End: 2020-10-29

## 2020-10-29 VITALS
WEIGHT: 245 LBS | HEART RATE: 62 BPM | HEIGHT: 71 IN | DIASTOLIC BLOOD PRESSURE: 84 MMHG | SYSTOLIC BLOOD PRESSURE: 135 MMHG

## 2020-10-29 DIAGNOSIS — K21.9 GASTRO-ESOPHAGEAL REFLUX DISEASE WITHOUT ESOPHAGITIS: ICD-10-CM

## 2020-10-29 DIAGNOSIS — K59.1 FUNCTIONAL DIARRHEA: ICD-10-CM

## 2020-10-29 DIAGNOSIS — R14.0 ABDOMINAL DISTENSION (GASEOUS): ICD-10-CM

## 2020-10-29 PROCEDURE — 99214 OFFICE O/P EST MOD 30 MIN: CPT | Performed by: NURSE PRACTITIONER

## 2020-10-29 RX ORDER — SIMETHICONE 250 MG/1
1000 CAPSULE, GELATIN COATED ORAL
Qty: 99 | Refills: 99 | Status: COMPLETED
Start: 2020-10-29 | End: 2021-03-18

## 2020-12-01 ENCOUNTER — LAB REQUISITION (OUTPATIENT)
Dept: LAB | Facility: HOSPITAL | Age: 40
End: 2020-12-01

## 2020-12-01 DIAGNOSIS — M46.96 UNSPECIFIED INFLAMMATORY SPONDYLOPATHY, LUMBAR REGION (HCC): ICD-10-CM

## 2020-12-01 DIAGNOSIS — M51.37 OTHER INTERVERTEBRAL DISC DEGENERATION, LUMBOSACRAL REGION: ICD-10-CM

## 2020-12-01 LAB
ANION GAP SERPL CALCULATED.3IONS-SCNC: 10 MMOL/L (ref 5–15)
BASOPHILS # BLD AUTO: 0 10*3/MM3 (ref 0–0.2)
BASOPHILS NFR BLD AUTO: 0.1 % (ref 0–1.5)
BUN SERPL-MCNC: 16 MG/DL (ref 6–20)
BUN/CREAT SERPL: 13.6 (ref 7–25)
CALCIUM SPEC-SCNC: 8.3 MG/DL (ref 8.6–10.5)
CHLORIDE SERPL-SCNC: 101 MMOL/L (ref 98–107)
CO2 SERPL-SCNC: 26 MMOL/L (ref 22–29)
CREAT SERPL-MCNC: 1.18 MG/DL (ref 0.76–1.27)
DEPRECATED RDW RBC AUTO: 45.9 FL (ref 37–54)
EOSINOPHIL # BLD AUTO: 0 10*3/MM3 (ref 0–0.4)
EOSINOPHIL NFR BLD AUTO: 0 % (ref 0.3–6.2)
ERYTHROCYTE [DISTWIDTH] IN BLOOD BY AUTOMATED COUNT: 14.2 % (ref 12.3–15.4)
GFR SERPL CREATININE-BSD FRML MDRD: 68 ML/MIN/1.73
GLUCOSE SERPL-MCNC: 162 MG/DL (ref 65–99)
HCT VFR BLD AUTO: 36.7 % (ref 37.5–51)
HGB BLD-MCNC: 12.2 G/DL (ref 13–17.7)
LYMPHOCYTES # BLD AUTO: 1.2 10*3/MM3 (ref 0.7–3.1)
LYMPHOCYTES NFR BLD AUTO: 7.9 % (ref 19.6–45.3)
MCH RBC QN AUTO: 31.2 PG (ref 26.6–33)
MCHC RBC AUTO-ENTMCNC: 33.3 G/DL (ref 31.5–35.7)
MCV RBC AUTO: 93.5 FL (ref 79–97)
MONOCYTES # BLD AUTO: 0.6 10*3/MM3 (ref 0.1–0.9)
MONOCYTES NFR BLD AUTO: 3.8 % (ref 5–12)
NEUTROPHILS NFR BLD AUTO: 12.9 10*3/MM3 (ref 1.7–7)
NEUTROPHILS NFR BLD AUTO: 88.2 % (ref 42.7–76)
NRBC BLD AUTO-RTO: 0 /100 WBC (ref 0–0.2)
PLATELET # BLD AUTO: 230 10*3/MM3 (ref 140–450)
PMV BLD AUTO: 8.6 FL (ref 6–12)
POTASSIUM SERPL-SCNC: 4.2 MMOL/L (ref 3.5–5.2)
RBC # BLD AUTO: 3.92 10*6/MM3 (ref 4.14–5.8)
SODIUM SERPL-SCNC: 137 MMOL/L (ref 136–145)
WBC # BLD AUTO: 14.7 10*3/MM3 (ref 3.4–10.8)

## 2020-12-01 PROCEDURE — 80048 BASIC METABOLIC PNL TOTAL CA: CPT | Performed by: ORTHOPAEDIC SURGERY

## 2020-12-01 PROCEDURE — 85025 COMPLETE CBC W/AUTO DIFF WBC: CPT | Performed by: ORTHOPAEDIC SURGERY

## 2020-12-02 ENCOUNTER — LAB REQUISITION (OUTPATIENT)
Dept: LAB | Facility: HOSPITAL | Age: 40
End: 2020-12-02

## 2020-12-02 DIAGNOSIS — M46.96 UNSPECIFIED INFLAMMATORY SPONDYLOPATHY, LUMBAR REGION (HCC): ICD-10-CM

## 2020-12-02 DIAGNOSIS — M51.37 OTHER INTERVERTEBRAL DISC DEGENERATION, LUMBOSACRAL REGION: ICD-10-CM

## 2020-12-02 LAB
ANION GAP SERPL CALCULATED.3IONS-SCNC: 9 MMOL/L (ref 5–15)
BASOPHILS # BLD AUTO: 0 10*3/MM3 (ref 0–0.2)
BASOPHILS NFR BLD AUTO: 0 % (ref 0–1.5)
BUN SERPL-MCNC: 20 MG/DL (ref 6–20)
BUN/CREAT SERPL: 18.5 (ref 7–25)
CALCIUM SPEC-SCNC: 8.3 MG/DL (ref 8.6–10.5)
CHLORIDE SERPL-SCNC: 104 MMOL/L (ref 98–107)
CO2 SERPL-SCNC: 26 MMOL/L (ref 22–29)
CREAT SERPL-MCNC: 1.08 MG/DL (ref 0.76–1.27)
DEPRECATED RDW RBC AUTO: 46.4 FL (ref 37–54)
EOSINOPHIL # BLD AUTO: 0 10*3/MM3 (ref 0–0.4)
EOSINOPHIL NFR BLD AUTO: 0 % (ref 0.3–6.2)
ERYTHROCYTE [DISTWIDTH] IN BLOOD BY AUTOMATED COUNT: 14.3 % (ref 12.3–15.4)
GFR SERPL CREATININE-BSD FRML MDRD: 75 ML/MIN/1.73
GLUCOSE SERPL-MCNC: 142 MG/DL (ref 65–99)
HCT VFR BLD AUTO: 35.1 % (ref 37.5–51)
HGB BLD-MCNC: 11.5 G/DL (ref 13–17.7)
LYMPHOCYTES # BLD AUTO: 1.3 10*3/MM3 (ref 0.7–3.1)
LYMPHOCYTES NFR BLD AUTO: 7.7 % (ref 19.6–45.3)
MCH RBC QN AUTO: 30.8 PG (ref 26.6–33)
MCHC RBC AUTO-ENTMCNC: 32.7 G/DL (ref 31.5–35.7)
MCV RBC AUTO: 94.4 FL (ref 79–97)
MONOCYTES # BLD AUTO: 0.9 10*3/MM3 (ref 0.1–0.9)
MONOCYTES NFR BLD AUTO: 5.2 % (ref 5–12)
NEUTROPHILS NFR BLD AUTO: 15.3 10*3/MM3 (ref 1.7–7)
NEUTROPHILS NFR BLD AUTO: 87.1 % (ref 42.7–76)
NRBC BLD AUTO-RTO: 0 /100 WBC (ref 0–0.2)
PLATELET # BLD AUTO: 229 10*3/MM3 (ref 140–450)
PMV BLD AUTO: 8.8 FL (ref 6–12)
POTASSIUM SERPL-SCNC: 4.6 MMOL/L (ref 3.5–5.2)
RBC # BLD AUTO: 3.72 10*6/MM3 (ref 4.14–5.8)
SODIUM SERPL-SCNC: 139 MMOL/L (ref 136–145)
WBC # BLD AUTO: 17.5 10*3/MM3 (ref 3.4–10.8)

## 2020-12-02 PROCEDURE — 80048 BASIC METABOLIC PNL TOTAL CA: CPT | Performed by: ORTHOPAEDIC SURGERY

## 2020-12-02 PROCEDURE — 85025 COMPLETE CBC W/AUTO DIFF WBC: CPT | Performed by: ORTHOPAEDIC SURGERY

## 2021-03-18 ENCOUNTER — OFFICE (AMBULATORY)
Dept: URBAN - METROPOLITAN AREA CLINIC 64 | Facility: CLINIC | Age: 41
End: 2021-03-18

## 2021-03-18 VITALS
HEART RATE: 90 BPM | HEIGHT: 71 IN | SYSTOLIC BLOOD PRESSURE: 125 MMHG | DIASTOLIC BLOOD PRESSURE: 74 MMHG | WEIGHT: 249 LBS

## 2021-03-18 DIAGNOSIS — R14.0 ABDOMINAL DISTENSION (GASEOUS): ICD-10-CM

## 2021-03-18 DIAGNOSIS — K59.1 FUNCTIONAL DIARRHEA: ICD-10-CM

## 2021-03-18 DIAGNOSIS — R63.5 ABNORMAL WEIGHT GAIN: ICD-10-CM

## 2021-03-18 PROCEDURE — 99212 OFFICE O/P EST SF 10 MIN: CPT | Performed by: NURSE PRACTITIONER

## 2022-02-25 ENCOUNTER — OFFICE (AMBULATORY)
Dept: URBAN - METROPOLITAN AREA CLINIC 64 | Facility: CLINIC | Age: 42
End: 2022-02-25

## 2022-02-25 VITALS
WEIGHT: 254 LBS | DIASTOLIC BLOOD PRESSURE: 80 MMHG | HEART RATE: 76 BPM | SYSTOLIC BLOOD PRESSURE: 147 MMHG | HEIGHT: 71 IN

## 2022-02-25 DIAGNOSIS — K59.1 FUNCTIONAL DIARRHEA: ICD-10-CM

## 2022-02-25 DIAGNOSIS — R14.0 ABDOMINAL DISTENSION (GASEOUS): ICD-10-CM

## 2022-02-25 DIAGNOSIS — R10.11 RIGHT UPPER QUADRANT PAIN: ICD-10-CM

## 2022-02-25 DIAGNOSIS — Z87.19 PERSONAL HISTORY OF OTHER DISEASES OF THE DIGESTIVE SYSTEM: ICD-10-CM

## 2022-02-25 DIAGNOSIS — K21.9 GASTRO-ESOPHAGEAL REFLUX DISEASE WITHOUT ESOPHAGITIS: ICD-10-CM

## 2022-02-25 PROBLEM — K57.30 DVRTCLOS OF LG INT W/O PERFORATION OR ABSCESS W/O BLEEDING: Status: ACTIVE | Noted: 2019-03-26

## 2022-02-25 PROCEDURE — 99214 OFFICE O/P EST MOD 30 MIN: CPT | Performed by: NURSE PRACTITIONER

## 2022-02-25 RX ORDER — RIFAXIMIN 550 MG/1
1650 TABLET ORAL
Qty: 42 | Refills: 2 | Status: COMPLETED
Start: 2022-02-25 | End: 2022-05-04

## 2022-02-25 RX ORDER — OMEPRAZOLE 40 MG/1
40 CAPSULE, DELAYED RELEASE ORAL
Qty: 90 | Refills: 3 | Status: COMPLETED
Start: 2022-02-25 | End: 2022-12-22

## 2022-05-04 ENCOUNTER — OFFICE (AMBULATORY)
Dept: URBAN - METROPOLITAN AREA CLINIC 64 | Facility: CLINIC | Age: 42
End: 2022-05-04

## 2022-05-04 VITALS
DIASTOLIC BLOOD PRESSURE: 92 MMHG | HEIGHT: 71 IN | SYSTOLIC BLOOD PRESSURE: 153 MMHG | WEIGHT: 261 LBS | HEART RATE: 71 BPM

## 2022-05-04 DIAGNOSIS — R14.3 FLATULENCE: ICD-10-CM

## 2022-05-04 DIAGNOSIS — R10.84 GENERALIZED ABDOMINAL PAIN: ICD-10-CM

## 2022-05-04 PROCEDURE — 99214 OFFICE O/P EST MOD 30 MIN: CPT | Performed by: INTERNAL MEDICINE

## 2022-05-04 RX ORDER — DICYCLOMINE HYDROCHLORIDE 20 MG/1
60 TABLET ORAL
Qty: 90 | Refills: 11 | Status: COMPLETED
Start: 2022-05-04 | End: 2022-12-22

## 2022-12-22 ENCOUNTER — OFFICE (AMBULATORY)
Dept: URBAN - METROPOLITAN AREA CLINIC 64 | Facility: CLINIC | Age: 42
End: 2022-12-22

## 2022-12-22 VITALS
WEIGHT: 255 LBS | HEART RATE: 70 BPM | HEIGHT: 71 IN | DIASTOLIC BLOOD PRESSURE: 71 MMHG | SYSTOLIC BLOOD PRESSURE: 120 MMHG

## 2022-12-22 DIAGNOSIS — K21.9 GASTRO-ESOPHAGEAL REFLUX DISEASE WITHOUT ESOPHAGITIS: ICD-10-CM

## 2022-12-22 DIAGNOSIS — K57.92 DIVERTICULITIS OF INTESTINE, PART UNSPECIFIED, WITHOUT PERFO: ICD-10-CM

## 2022-12-22 DIAGNOSIS — K59.1 FUNCTIONAL DIARRHEA: ICD-10-CM

## 2022-12-22 DIAGNOSIS — R15.2 FECAL URGENCY: ICD-10-CM

## 2022-12-22 PROCEDURE — 99214 OFFICE O/P EST MOD 30 MIN: CPT | Performed by: NURSE PRACTITIONER

## 2022-12-22 RX ORDER — DICYCLOMINE HYDROCHLORIDE 20 MG/1
80 TABLET ORAL
Qty: 120 | Refills: 12 | Status: ACTIVE
Start: 2022-12-22

## 2022-12-22 RX ORDER — COLESEVELAM HYDROCHLORIDE 625 MG/1
1875 TABLET, FILM COATED ORAL
Qty: 90 | Refills: 11 | Status: ACTIVE
Start: 2022-12-22

## 2023-02-21 ENCOUNTER — LAB REQUISITION (OUTPATIENT)
Dept: LAB | Facility: HOSPITAL | Age: 43
End: 2023-02-21
Payer: MEDICARE

## 2023-02-21 DIAGNOSIS — K80.20 CALCULUS OF GALLBLADDER WITHOUT CHOLECYSTITIS WITHOUT OBSTRUCTION: ICD-10-CM

## 2023-02-21 PROCEDURE — 88304 TISSUE EXAM BY PATHOLOGIST: CPT | Performed by: SURGERY

## 2023-02-22 LAB
LAB AP CASE REPORT: NORMAL
PATH REPORT.FINAL DX SPEC: NORMAL
PATH REPORT.GROSS SPEC: NORMAL

## 2024-03-22 ENCOUNTER — HOSPITAL ENCOUNTER (EMERGENCY)
Facility: HOSPITAL | Age: 44
Discharge: HOME | End: 2024-03-22
Attending: EMERGENCY MEDICINE
Payer: OTHER MISCELLANEOUS

## 2024-03-22 ENCOUNTER — APPOINTMENT (OUTPATIENT)
Dept: GENERAL RADIOLOGY | Facility: HOSPITAL | Age: 44
End: 2024-03-22
Payer: OTHER MISCELLANEOUS

## 2024-03-22 ENCOUNTER — APPOINTMENT (OUTPATIENT)
Dept: CT IMAGING | Facility: HOSPITAL | Age: 44
End: 2024-03-22
Payer: OTHER MISCELLANEOUS

## 2024-03-22 VITALS
BODY MASS INDEX: 36.23 KG/M2 | HEART RATE: 60 BPM | TEMPERATURE: 97.9 F | OXYGEN SATURATION: 98 % | HEIGHT: 71 IN | DIASTOLIC BLOOD PRESSURE: 81 MMHG | RESPIRATION RATE: 17 BRPM | WEIGHT: 258.82 LBS | SYSTOLIC BLOOD PRESSURE: 112 MMHG

## 2024-03-22 DIAGNOSIS — S40.012A CONTUSION OF LEFT SHOULDER, INITIAL ENCOUNTER: ICD-10-CM

## 2024-03-22 DIAGNOSIS — M25.512 ACUTE PAIN OF LEFT SHOULDER: Primary | ICD-10-CM

## 2024-03-22 LAB
ANION GAP SERPL CALCULATED.3IONS-SCNC: 11 MMOL/L (ref 5–15)
BASOPHILS # BLD AUTO: 0.05 10*3/MM3 (ref 0–0.2)
BASOPHILS NFR BLD AUTO: 0.7 % (ref 0–1.5)
BUN SERPL-MCNC: 12 MG/DL (ref 6–20)
BUN/CREAT SERPL: 9.5 (ref 7–25)
CALCIUM SPEC-SCNC: 8.1 MG/DL (ref 8.6–10.5)
CHLORIDE SERPL-SCNC: 103 MMOL/L (ref 98–107)
CO2 SERPL-SCNC: 25 MMOL/L (ref 22–29)
CREAT SERPL-MCNC: 1.26 MG/DL (ref 0.76–1.27)
DEPRECATED RDW RBC AUTO: 42.3 FL (ref 37–54)
EGFRCR SERPLBLD CKD-EPI 2021: 72.1 ML/MIN/1.73
EOSINOPHIL # BLD AUTO: 0.21 10*3/MM3 (ref 0–0.4)
EOSINOPHIL NFR BLD AUTO: 2.9 % (ref 0.3–6.2)
ERYTHROCYTE [DISTWIDTH] IN BLOOD BY AUTOMATED COUNT: 12.2 % (ref 12.3–15.4)
GLUCOSE SERPL-MCNC: 100 MG/DL (ref 65–99)
HCT VFR BLD AUTO: 40.1 % (ref 37.5–51)
HGB BLD-MCNC: 13.4 G/DL (ref 13–17.7)
IMM GRANULOCYTES # BLD AUTO: 0.02 10*3/MM3 (ref 0–0.05)
IMM GRANULOCYTES NFR BLD AUTO: 0.3 % (ref 0–0.5)
LYMPHOCYTES # BLD AUTO: 2.57 10*3/MM3 (ref 0.7–3.1)
LYMPHOCYTES NFR BLD AUTO: 35 % (ref 19.6–45.3)
MCH RBC QN AUTO: 31.5 PG (ref 26.6–33)
MCHC RBC AUTO-ENTMCNC: 33.4 G/DL (ref 31.5–35.7)
MCV RBC AUTO: 94.4 FL (ref 79–97)
MONOCYTES # BLD AUTO: 0.48 10*3/MM3 (ref 0.1–0.9)
MONOCYTES NFR BLD AUTO: 6.5 % (ref 5–12)
NEUTROPHILS NFR BLD AUTO: 4.01 10*3/MM3 (ref 1.7–7)
NEUTROPHILS NFR BLD AUTO: 54.6 % (ref 42.7–76)
NRBC BLD AUTO-RTO: 0 /100 WBC (ref 0–0.2)
PLATELET # BLD AUTO: 282 10*3/MM3 (ref 140–450)
PMV BLD AUTO: 9.8 FL (ref 6–12)
POTASSIUM SERPL-SCNC: 4.1 MMOL/L (ref 3.5–5.2)
RBC # BLD AUTO: 4.25 10*6/MM3 (ref 4.14–5.8)
SODIUM SERPL-SCNC: 139 MMOL/L (ref 136–145)
WBC NRBC COR # BLD AUTO: 7.34 10*3/MM3 (ref 3.4–10.8)

## 2024-03-22 PROCEDURE — 25010000002 KETOROLAC TROMETHAMINE PER 15 MG: Performed by: PHYSICIAN ASSISTANT

## 2024-03-22 PROCEDURE — 96375 TX/PRO/DX INJ NEW DRUG ADDON: CPT

## 2024-03-22 PROCEDURE — 25010000002 ONDANSETRON PER 1 MG: Performed by: PHYSICIAN ASSISTANT

## 2024-03-22 PROCEDURE — 96374 THER/PROPH/DIAG INJ IV PUSH: CPT

## 2024-03-22 PROCEDURE — 25010000002 METHYLPREDNISOLONE PER 40 MG: Performed by: PHYSICIAN ASSISTANT

## 2024-03-22 PROCEDURE — 80048 BASIC METABOLIC PNL TOTAL CA: CPT | Performed by: PHYSICIAN ASSISTANT

## 2024-03-22 PROCEDURE — 71260 CT THORAX DX C+: CPT

## 2024-03-22 PROCEDURE — 85025 COMPLETE CBC W/AUTO DIFF WBC: CPT | Performed by: PHYSICIAN ASSISTANT

## 2024-03-22 PROCEDURE — 99285 EMERGENCY DEPT VISIT HI MDM: CPT

## 2024-03-22 PROCEDURE — 25010000002 HYDROMORPHONE 1 MG/ML SOLUTION: Performed by: PHYSICIAN ASSISTANT

## 2024-03-22 PROCEDURE — 73030 X-RAY EXAM OF SHOULDER: CPT

## 2024-03-22 PROCEDURE — 25510000001 IOPAMIDOL PER 1 ML: Performed by: EMERGENCY MEDICINE

## 2024-03-22 PROCEDURE — 25010000002 DIPHENHYDRAMINE PER 50 MG: Performed by: PHYSICIAN ASSISTANT

## 2024-03-22 RX ORDER — ONDANSETRON 2 MG/ML
4 INJECTION INTRAMUSCULAR; INTRAVENOUS ONCE
Status: COMPLETED | OUTPATIENT
Start: 2024-03-22 | End: 2024-03-22

## 2024-03-22 RX ORDER — KETOROLAC TROMETHAMINE 30 MG/ML
30 INJECTION, SOLUTION INTRAMUSCULAR; INTRAVENOUS ONCE
Status: COMPLETED | OUTPATIENT
Start: 2024-03-22 | End: 2024-03-22

## 2024-03-22 RX ORDER — METHYLPREDNISOLONE SODIUM SUCCINATE 40 MG/ML
40 INJECTION, POWDER, LYOPHILIZED, FOR SOLUTION INTRAMUSCULAR; INTRAVENOUS EVERY 4 HOURS
Qty: 6 ML | Refills: 0 | Status: DISCONTINUED | OUTPATIENT
Start: 2024-03-22 | End: 2024-03-23 | Stop reason: HOSPADM

## 2024-03-22 RX ORDER — SODIUM CHLORIDE 0.9 % (FLUSH) 0.9 %
10 SYRINGE (ML) INJECTION AS NEEDED
Status: DISCONTINUED | OUTPATIENT
Start: 2024-03-22 | End: 2024-03-23 | Stop reason: HOSPADM

## 2024-03-22 RX ORDER — LIDOCAINE 50 MG/G
1 PATCH TOPICAL EVERY 24 HOURS
Qty: 30 EACH | Refills: 0 | Status: SHIPPED | OUTPATIENT
Start: 2024-03-22

## 2024-03-22 RX ORDER — DIPHENHYDRAMINE HYDROCHLORIDE 50 MG/ML
50 INJECTION INTRAMUSCULAR; INTRAVENOUS
Status: COMPLETED | OUTPATIENT
Start: 2024-03-22 | End: 2024-03-22

## 2024-03-22 RX ADMIN — ONDANSETRON 4 MG: 2 INJECTION INTRAMUSCULAR; INTRAVENOUS at 20:24

## 2024-03-22 RX ADMIN — METHYLPREDNISOLONE SODIUM SUCCINATE 40 MG: 40 INJECTION, POWDER, FOR SOLUTION INTRAMUSCULAR; INTRAVENOUS at 21:02

## 2024-03-22 RX ADMIN — HYDROMORPHONE HYDROCHLORIDE 0.5 MG: 1 INJECTION, SOLUTION INTRAMUSCULAR; INTRAVENOUS; SUBCUTANEOUS at 20:35

## 2024-03-22 RX ADMIN — DIPHENHYDRAMINE HYDROCHLORIDE 50 MG: 50 INJECTION, SOLUTION INTRAMUSCULAR; INTRAVENOUS at 20:35

## 2024-03-22 RX ADMIN — IOPAMIDOL 100 ML: 755 INJECTION, SOLUTION INTRAVENOUS at 21:32

## 2024-03-22 RX ADMIN — KETOROLAC TROMETHAMINE 30 MG: 30 INJECTION, SOLUTION INTRAMUSCULAR at 23:01

## 2024-03-22 NOTE — Clinical Note
[facility] EMERGENCY DEPARTMENT  [address] [place] [address]  Phone: [phone]    Armaan Smith was seen and treated in our emergency department on 3/22/2024.  He may return to work on 03/23/2024.         Thank you for choosing [facility].    Jonas Danielle PA

## 2024-03-22 NOTE — ED PROVIDER NOTES
Subjective   History of Present Illness      Patient is a 43-year-old male comes in complaining of left shoulder pain for the past 2 days.  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  Patient states he was dumping the load of the truck when the truck had upended onto his side and rolled over onto the  side.  Patient states he had bad pain of the left shoulder and was seen and evaluated at [facility] and had normal x-ray of the left shoulder however upon discharge, patient had passed out secondary to pain and thus a CT scan of the head and EKG was performed and were unremarkable.  Patient was fit with a sling and sent home and instructed to follow-up with orthopedic doctor however patient has been getting the run around because of this and has not been able to see them in follow-up yet.  Patient was told to take Tylenol and ibuprofen for pain control.  Patient reports some nausea today and worsening pain today as well as into the collarbone area.      Review of Systems   Constitutional:  Negative for chills, fatigue and fever.   HENT:  Negative for congestion, sore throat, tinnitus and trouble swallowing.    Eyes:  Negative for photophobia, discharge and visual disturbance.   Respiratory:  Negative for cough, chest tightness, shortness of breath and wheezing.    Cardiovascular:  Negative for chest pain, palpitations and leg swelling.   Gastrointestinal:  Negative for abdominal pain, diarrhea, nausea and vomiting.   Genitourinary:  Negative for dysuria, flank pain and urgency.   Musculoskeletal:  Negative for arthralgias and myalgias.        Left shoulder pain, left rib pain   Skin:  Negative for rash.   Neurological:  Negative for dizziness and headaches.   Psychiatric/Behavioral:  Negative for confusion.        Past Medical History:   Diagnosis Date    Arthritis     Back pain     DDD (degenerative disc disease), cervical     DDD (degenerative disc disease),  lumbosacral     Diverticulitis     Diverticulitis     Facet joint disease of cervical region     GERD (gastroesophageal reflux disease)     Glaucoma 2019    early stages    Herniated muscle     Hip pain     Intestinal abscess     Migraine headache     Numbness     Sacrococcygeal disorders, not elsewhere classified        Allergies   Allergen Reactions    Contrast Dye (Echo Or Unknown Ct/Mr) Rash    Demerol [Meperidine] Shortness Of Breath     B/P dropped    Hydrocodone-Acetaminophen Rash    Latex, Natural Rubber Rash     Burn like area, blisters    Morphine Shortness Of Breath     Dropped b/p    Other Rash     Dissolving sutures, Vicryl, caused rash, redness and infection    Tramadol Rash       Past Surgical History:   Procedure Laterality Date    BACK SURGERY      lumbar    COLON RESECTION N/A 7/8/2019    Procedure: LAPAROSCOPIC SIGMOID COLECTOMY;  Surgeon: Idania Umanzor MD;  Location: [facility] MAIN OR;  Service: General    COLONOSCOPY      FOOT SURGERY Right     FRACTURE SURGERY      removed hardware in second surgery    OTHER SURGICAL HISTORY Right 09/10/2018    Micro right L4-S1 -- Dr. Waters     WISDOM TOOTH EXTRACTION         Family History   Problem Relation Age of Onset    Colon cancer Mother     Prostate cancer Father     Prostate cancer Brother        Social History     Socioeconomic History    Marital status:    Tobacco Use    Smoking status: Every Day    Smokeless tobacco: Never    Tobacco comments:     switched to vape   Substance and Sexual Activity    Alcohol use: No    Drug use: No    Sexual activity: Defer           Objective   Physical Exam  Vitals and nursing note reviewed.   Constitutional:       General: He is not in acute distress.     Appearance: He is well-developed. He is not diaphoretic.   HENT:      Head: Normocephalic and atraumatic.      Right Ear: External ear normal.      Left Ear: External ear normal.      Nose: Nose normal.      Mouth/Throat:      Mouth: Mucous membranes are  moist.   Eyes:      Extraocular Movements: Extraocular movements intact.      Conjunctiva/sclera: Conjunctivae normal.      Pupils: Pupils are equal, round, and reactive to light.   Cardiovascular:      Rate and Rhythm: Normal rate and regular rhythm.      Pulses: Normal pulses.      Heart sounds: Normal heart sounds.      Comments: S1, S2 audible.  Pulmonary:      Effort: Pulmonary effort is normal. No respiratory distress.      Breath sounds: Normal breath sounds. No wheezing.      Comments: On room air.  Abdominal:      General: Bowel sounds are normal. There is no distension.      Palpations: Abdomen is soft.      Tenderness: There is no abdominal tenderness. There is no guarding or rebound.   Musculoskeletal:         General: Tenderness and signs of injury present. No deformity.      Cervical back: Normal range of motion.      Right lower leg: No edema.      Left lower leg: No edema.      Comments: Patient has tenderness throughout the left shoulder.  Patient has significantly decreased range of motion of left shoulder.  Patient has some ecchymosis as well and has some yellowing around the bruising of the posterior shoulder and back area.  Patient has great range of motion of left hand and left forearm.  Patient has good radial and ulnar pulse of left upper extremity.   Skin:     General: Skin is warm.      Capillary Refill: Capillary refill takes less than 2 seconds.      Findings: No erythema or rash.   Neurological:      General: No focal deficit present.      Mental Status: He is alert and oriented to person, place, and time.      Cranial Nerves: No cranial nerve deficit.   Psychiatric:         Mood and Affect: Mood normal.         Behavior: Behavior normal.         Procedures           ED Course  ED Course as of 03/23/24 0421   Fri Mar 22, 2024   [address] [facility] hand sent paperwork from visit on Tuesday.  Patient had stated that patient presents today after injuring self at work while in a  truck as a truck fell over on the  side.  Patient stated he had a seatbelt on but the seatbelt did not catch and he fell into the side with subsequent left shoulder pain.  Patient denies any loss of consciousness, dizziness or lightheadedness.  Patient having hard time lifting left arm above shoulder line.  X-ray of left shoulder was negative for acute osseous abnormality.  CT head and blood work was unremarkable and patient discharged home after given 1 L normal saline.  Patient also thought to possibly have a reaction to hydrocodone during ED course. [RL]      ED Course User Index  [RL] Jonas Danielle PA                                 Labs Reviewed   BASIC METABOLIC PANEL - Abnormal; Notable for the following components:       Result Value    Glucose 100 (*)     Calcium 8.1 (*)     All other components within normal limits    Narrative:     GFR Normal >60  Chronic Kidney Disease <60  Kidney Failure <15     CBC WITH AUTO DIFFERENTIAL - Abnormal; Notable for the following components:    RDW 12.2 (*)     All other components within normal limits   CBC AND DIFFERENTIAL    Narrative:     The following orders were created for panel order CBC & Differential.  Procedure                               Abnormality         Status                     ---------                               -----------         ------                     CBC Auto Differential[[id number]]        Abnormal            Final result                 Please view results for these tests on the individual orders.                 Medical Decision Making  Problems Addressed:  Acute pain of left shoulder: complicated acute illness or injury  Contusion of left shoulder, initial encounter: complicated acute illness or injury    Amount and/or Complexity of Data Reviewed  Labs: ordered.  Radiology: ordered.    Risk  Prescription drug management.      Differential diagnosis: humerus fracture, rib fracture, not meant to be an all inclusive list.  Chart review:  "Upon chart review, see above    EKG:  considered but not indicated    Imaging: If applicable see my interpretation above.  CT Chest With Contrast Diagnostic   Final Result   Impression:      1. No acute findings.   2. Incidental findings as noted above.            Electronically Signed: Jeffrey Solomon MD     3/22/2024 9:45 PM EDT     Workstation ID: [id number]      XR Shoulder 2+ View Left   Final Result   Impression: Normal exam.         Electronically Signed: Fabian Melendez MD     3/22/2024 8:09 PM EDT     Workstation ID: [id number]        Labs:  Lab work independently interpreted by myself shows CBC and BMP largely unremarkable for acute findings.    Vitals:  /81   Pulse 60   Temp 97.9 °F (36.6 °C)   Resp 17   Ht 180.3 cm (71\")   Wt 117 kg (258 lb 13.1 oz)   SpO2 98%   BMI 36.10 kg/m²    Medications given:    Medications   ondansetron (ZOFRAN) injection 4 mg (4 mg Intravenous Given 3/22/24 2024)   HYDROmorphone (DILAUDID) injection 0.5 mg (0.5 mg Intravenous Given 3/22/24 2035)   diphenhydrAMINE (BENADRYL) injection 50 mg (50 mg Intravenous Given 3/22/24 2035)   iopamidol (ISOVUE-370) 76 % injection 100 mL (100 mL Intravenous Given 3/22/24 2132)   ketorolac (TORADOL) injection 30 mg (30 mg Intravenous Given 3/22/24 2301)       Procedures:  not indicated  MDM: Patient is a 43-year-old male comes in complaining of  left shoulder pain.  Lab work independently interpreted by myself shows CBC and BMP largely unremarkable for acute findings.  Patient was given Zofran and Dilaudid for pain control with significant improvement of pain.  X-ray left shoulder shows normal exam.  CT chest with contrast shows no acute findings.  Patient was then given Benadryl and Toradol upon discharge as well.  Patient had unremarkable workup at [facility] given records as described above.  Patient instructed follow-up with orthopedic surgery and occupational health and patient voiced understanding.  Patient appeared in no acute " distress upon discharge and vital signs unremarkable.  See full discharge instructions for further details. Plan discussed with patient and is agreeable with plan.      Final diagnoses:   Acute pain of left shoulder   Contusion of left shoulder, initial encounter       ED Disposition  ED Disposition       ED Disposition   Discharge    Condition   Stable    Comment   --               [facility] EMERGENCY DEPARTMENT  [address] [id number] [phone]  Go in 1 day  As needed, If symptoms worsen    Sergio Gomez MD  [address]  [place] [address]  [phone]    Schedule an appointment as soon as possible for a visit in 1 week  As needed    Jonas Rosario MD  [address] [place] [address]  [phone]    Schedule an appointment as soon as possible for a visit in 1 week  As needed    [facility] OCCUPATIONAL MEDICINE [facility]   [address]   [place] [address]  [phone]  Call in 1 week  As needed         Medication List        New Prescriptions      lidocaine 5 %  Commonly known as: LIDODERM  Place 1 patch on the skin as directed by provider Daily. Remove & Discard patch within 12 hours or as directed by MD               Where to Get Your Medications        These medications were sent to [facility] - [place], [place] - [address] - [phone]  - [phone] FX  [address]  [place] [address]      Phone: [phone]   lidocaine 5 %            Jonas Danielle PA  03/23/24 0421       Jonas Danielle PA  03/23/24 0421

## 2024-03-23 NOTE — DISCHARGE INSTRUCTIONS
Please wear sling until follow-up with orthopedic surgery in 1 week's time.  Can use lidocaine patches for local relief.  Please return to the ER if symptoms worsen.  Please ice 20 minutes on 20 minutes off.  Please alternate between Tylenol and ibuprofen for pain control.  Please rest upper extremity.

## (undated) DEVICE — APPL CHLORAPREP W/TINT 26ML ORNG

## (undated) DEVICE — SOL NACL 0.9PCT 1000ML

## (undated) DEVICE — PK PROC TURNOVER

## (undated) DEVICE — CUFF SCD HEMOFORCE SEQ CALF STD MD

## (undated) DEVICE — ENDOPATH XCEL WITH OPTIVIEW TECHNOLOGY BLADELESS TROCARS WITH STABILITY SLEEVES: Brand: ENDOPATH XCEL OPTIVIEW

## (undated) DEVICE — THE ECHELON FLEX POWERED PLUS ARTICULATING ENDOSCOPIC LINEAR CUTTERS ARE STERILE, SINGLE PATIENT USE INSTRUMENTS THAT SIMULTANEOUSLYCUT AND STAPLE TISSUE. THERE ARE SIX STAGGERED ROWS OF STAPLES, THREE ON EITHER SIDE OF THE CUT LINE. THE ECHELON FLEX 45 POWERED PLUSINSTRUMENTS HAVE A STAPLE LINE THAT IS APPROXIMATELY 45 MM LONG AND A CUT LINE THAT IS APPROXIMATELY 42 MM LONG. THE SHAFT CAN ROTATE FREELYIN BOTH DIRECTIONS AND AN ARTICULATION MECHANISM ENABLES THE DISTAL PORTION OF THE SHAFT TO PIVOT TO FACILITATE LATERAL ACCESS TO THE OPERATIVESITE.THE INSTRUMENTS ARE PACKAGED WITH A PRIMARY LITHIUM BATTERY PACK THAT MUST BE INSTALLED PRIOR TO USE. THERE ARE SPECIFIC REQUIREMENTS FORDISPOSING OF THE BATTERY PACK. REFER TO THE BATTERY PACK DISPOSAL SECTION.THE INSTRUMENTS ARE PACKAGED WITHOUT A RELOAD AND MUST BE LOADED PRIOR TO USE. A STAPLE RETAINING CAP ON THE RELOAD PROTECTS THE STAPLE LEGPOINTS DURING SHIPPING AND TRANSPORTATION. THE INSTRUMENTS’ LOCK-OUT FEATURE IS DESIGNED TO PREVENT A USED OR IMPROPERLY INSTALLED RELOADFROM BEING REFIRED OR AN INSTRUMENT FROM BEING FIRED WITHOUT A RELOAD.: Brand: ECHELON FLEX

## (undated) DEVICE — ELECTRD BLD EZ CLN STD 6.5IN

## (undated) DEVICE — TP SXN YANKR BULB STRL

## (undated) DEVICE — LAPAROSCOPIC GAS CONDITIONING DEVICE.: Brand: INSUFLOW

## (undated) DEVICE — DRSNG WND BORDR/ADHS NONADHR/GZ LF 4X4IN STRL

## (undated) DEVICE — SPNG LAP PREWSH SFTPK 18X18IN STRL PK/5

## (undated) DEVICE — ACCESS PLATFORM FOR MINIMALLY INVASIVE SURGERY.: Brand: GELPORT® LAPAROSCOPIC  SYSTEM

## (undated) DEVICE — 3M™ STERI-DRAPE™ INSTRUMENT POUCH 9097: Brand: 3M™ STERI-DRAPE™

## (undated) DEVICE — GOWN,REINFORCE,POLY,SIRUS,BREATH SLV,XLG: Brand: MEDLINE

## (undated) DEVICE — ENDOPATH 5 MM GRASPERS WITH RATCHET HANDLES: Brand: ENDOPATH

## (undated) DEVICE — SUT SILK 3/0 SH CR8 18IN C013D

## (undated) DEVICE — ENDOPATH XCEL BLADELESS TROCARS WITH STABILITY SLEEVES: Brand: ENDOPATH XCEL

## (undated) DEVICE — SUT VIC 1 CTX 36IN J977H

## (undated) DEVICE — 2, DISPOSABLE SUCTION/IRRIGATOR WITH DISPOSABLE TIP: Brand: STRYKEFLOW

## (undated) DEVICE — HARMONIC ACE 5MM DIAMETER SHEARS 36CM SHAFT LENGTH + ADAPTIVE TISSUE TECHNOLOGY FOR USE WITH GENERATOR G11: Brand: HARMONIC ACE

## (undated) DEVICE — BLAKE SILICONE DRAIN, 19 FR ROUND, HUBLESS WITH 1/4" BENDABLE TROCAR: Brand: BLAKE

## (undated) DEVICE — IRRIGATOR BULB ASEPTO 60CC STRL

## (undated) DEVICE — ENDOPATH XCEL BLUNT TIP TROCARS WITH SMOOTH SLEEVES: Brand: ENDOPATH XCEL

## (undated) DEVICE — UNDERGLV SURG BIOGEL INDICAT PF 61/2 GRN

## (undated) DEVICE — COVER,MAYO STAND,STERILE: Brand: MEDLINE

## (undated) DEVICE — SOL IRRIG H2O 1000ML STRL

## (undated) DEVICE — DRSNG WND BORDR/ADHS NONADHR/GZ LF 4X10IN STRL

## (undated) DEVICE — KT ANTI FOG W/FLD AND SPNG

## (undated) DEVICE — SUT PDS 1 TP1 48IN Z880G BX/12

## (undated) DEVICE — 40580 - THE PINK PAD - ADVANCED TRENDELENBURG POSITIONING KIT: Brand: 40580 - THE PINK PAD - ADVANCED TRENDELENBURG POSITIONING KIT

## (undated) DEVICE — TUBING, SUCTION, 1/4" X 12', STRAIGHT: Brand: MEDLINE

## (undated) DEVICE — ECHELON FLEX POWERED PLUS ARTICULATING ENDOSCOPIC LINEAR CUTTER , 60MM: Brand: ECHELON FLEX

## (undated) DEVICE — GLV SURG BIOGEL SENSR LTX PF SZ6.5

## (undated) DEVICE — SUT SILK 2/0 FS BLK 18IN 685G

## (undated) DEVICE — HARMONIC ACE +7 LAPAROSCOPIC SHEARS ADVANCED HEMOSTASIS 5MM DIAMETER 36CM SHAFT LENGTH  FOR USE WITH GRAY HAND PIECE ONLY: Brand: HARMONIC ACE

## (undated) DEVICE — 3M™ STERI-DRAPE™ INSTRUMENT POUCH 1018L: Brand: STERI-DRAPE™

## (undated) DEVICE — PK GYN LAPAROSCOPY 50